# Patient Record
Sex: FEMALE | Race: BLACK OR AFRICAN AMERICAN | NOT HISPANIC OR LATINO | Employment: UNEMPLOYED | URBAN - METROPOLITAN AREA
[De-identification: names, ages, dates, MRNs, and addresses within clinical notes are randomized per-mention and may not be internally consistent; named-entity substitution may affect disease eponyms.]

---

## 2024-01-15 ENCOUNTER — HOSPITAL ENCOUNTER (EMERGENCY)
Facility: HOSPITAL | Age: 44
Discharge: HOME | End: 2024-01-16
Attending: FAMILY MEDICINE
Payer: MEDICAID

## 2024-01-15 VITALS
HEIGHT: 67 IN | OXYGEN SATURATION: 100 % | HEART RATE: 90 BPM | SYSTOLIC BLOOD PRESSURE: 150 MMHG | DIASTOLIC BLOOD PRESSURE: 86 MMHG | TEMPERATURE: 98 F | RESPIRATION RATE: 18 BRPM | WEIGHT: 130 LBS | BODY MASS INDEX: 20.4 KG/M2

## 2024-01-15 DIAGNOSIS — R60.9 SWELLING: ICD-10-CM

## 2024-01-15 DIAGNOSIS — S82.891A CLOSED FRACTURE OF RIGHT ANKLE, INITIAL ENCOUNTER: Primary | ICD-10-CM

## 2024-01-15 PROCEDURE — 99284 EMERGENCY DEPT VISIT MOD MDM: CPT

## 2024-01-15 PROCEDURE — 29515 APPLICATION SHORT LEG SPLINT: CPT | Mod: RT

## 2024-01-15 RX ORDER — DICLOFENAC SODIUM 50 MG/1
50 TABLET, DELAYED RELEASE ORAL 3 TIMES DAILY
Qty: 15 TABLET | Refills: 0 | Status: SHIPPED | OUTPATIENT
Start: 2024-01-15 | End: 2024-01-20

## 2024-01-15 RX ORDER — HYDROCODONE BITARTRATE AND ACETAMINOPHEN 5; 325 MG/1; MG/1
1 TABLET ORAL EVERY 6 HOURS PRN
Qty: 12 TABLET | Refills: 0 | Status: ON HOLD | OUTPATIENT
Start: 2024-01-15 | End: 2024-01-26

## 2024-01-15 RX ORDER — KETOROLAC TROMETHAMINE 30 MG/ML
60 INJECTION, SOLUTION INTRAMUSCULAR; INTRAVENOUS
Status: COMPLETED | OUTPATIENT
Start: 2024-01-15 | End: 2024-01-16

## 2024-01-16 DIAGNOSIS — S82.841A: Primary | ICD-10-CM

## 2024-01-16 PROCEDURE — 96372 THER/PROPH/DIAG INJ SC/IM: CPT | Mod: 59 | Performed by: FAMILY MEDICINE

## 2024-01-16 PROCEDURE — 29515 APPLICATION SHORT LEG SPLINT: CPT | Mod: RT

## 2024-01-16 PROCEDURE — 63600175 PHARM REV CODE 636 W HCPCS: Performed by: FAMILY MEDICINE

## 2024-01-16 RX ADMIN — KETOROLAC TROMETHAMINE 60 MG: 30 INJECTION, SOLUTION INTRAMUSCULAR; INTRAVENOUS at 12:01

## 2024-01-16 NOTE — ED NOTES
01/16/2024:0958am/ seen no referral done for self pay pt/ referral done at this time for [facility] ortho

## 2024-01-16 NOTE — ED PROVIDER NOTES
Encounter Date: 1/15/2024       History     Chief Complaint   Patient presents with    Ankle Pain     Slipped and fell. Right ankle is swollen. Pulse palpable.      Ankle pain   Right ankle after twisting her ankle patient has mild swelling no chronic condition contributing to today's episode    The history is provided by the patient.     Review of patient's allergies indicates:  No Known Allergies  No past medical history on file.  No past surgical history on file.  No family history on file.     Review of Systems   Constitutional:  Negative for fever.   HENT:  Negative for sore throat.    Respiratory:  Negative for shortness of breath.    Cardiovascular:  Negative for chest pain.   Gastrointestinal:  Negative for nausea.   Genitourinary:  Negative for dysuria.   Musculoskeletal:  Positive for arthralgias and myalgias. Negative for back pain.   Skin:  Negative for rash.   Neurological:  Negative for weakness.   Hematological:  Does not bruise/bleed easily.   All other systems reviewed and are negative.      Physical Exam     Initial Vitals [01/15/24 2155]   BP Pulse Resp Temp SpO2   (!) 150/86 90 18 98 °F (36.7 °C) 100 %      MAP       --         Physical Exam    Nursing note and vitals reviewed.  Constitutional: She appears well-developed.   HENT:   Head: Normocephalic and atraumatic.   Right Ear: External ear normal.   Left Ear: External ear normal.   Nose: Nose normal.   Mouth/Throat: Oropharynx is clear and moist. No oropharyngeal exudate.   Eyes: Conjunctivae and EOM are normal. Pupils are equal, round, and reactive to light. Right eye exhibits no discharge. Left eye exhibits no discharge.   Neck: Neck supple. No tracheal deviation present. No JVD present.   Normal range of motion.  Cardiovascular:  Normal rate, regular rhythm, normal heart sounds and intact distal pulses.     Exam reveals no gallop and no friction rub.       No murmur heard.  Pulmonary/Chest: Breath sounds normal. No stridor. No respiratory  distress. She has no wheezes. She has no rhonchi. She has no rales.   Abdominal: Abdomen is soft. Bowel sounds are normal. She exhibits no distension and no mass. There is no abdominal tenderness. There is no rebound and no guarding.   Musculoskeletal:         General: Edema present. Normal range of motion.      Cervical back: Normal range of motion and neck supple.     Neurological: She is alert and oriented to person, place, and time. She has normal strength. No cranial nerve deficit.   Skin: Skin is warm and dry. No rash and no abscess noted. No erythema.   Psychiatric: She has a normal mood and affect. Her behavior is normal. Judgment and thought content normal.         ED Course   Procedures  Labs Reviewed - No data to display       Imaging Results              X-Ray Ankle Complete Right (In process)                      Medications   ketorolac injection 60 mg (has no administration in time range)     Medical Decision Making  Fracture contusion sprain strain injury    Amount and/or Complexity of Data Reviewed  Radiology: ordered.    Risk  Prescription drug management.                                      Clinical Impression:  Final diagnoses:  [R60.9] Swelling  [S82.891A] Closed fracture of right ankle, initial encounter (Primary)          ED Disposition Condition    Discharge Stable          ED Prescriptions       Medication Sig Dispense Start Date End Date Auth. Provider    HYDROcodone-acetaminophen (NORCO) 5-325 mg per tablet Take 1 tablet by mouth every 6 (six) hours as needed for Pain. 12 tablet 1/15/2024 -- Timothy Guy MD    diclofenac (VOLTAREN) 50 MG EC tablet Take 1 tablet (50 mg total) by mouth 3 (three) times daily. for 5 days 15 tablet 1/15/2024 1/20/2024 Timothy Guy MD          Follow-up Information       Follow up With Specialties Details Why Contact Info    Orthopedic surgery    Follow-up with orthopedic surgery for definitive treatment no weight-bearing on that side              Timothy Guy MD  01/15/24 2122       Timothy Guy MD  01/15/24 0751

## 2024-01-16 NOTE — ED NOTES
Instructed on use of crutches. Return demonstration noted. Patient able to use crutches adequately.

## 2024-01-16 NOTE — ED NOTES
Short leg splint applied to right lower extremity. Instructed on CMS checks. Verbalized understanding.

## 2024-01-26 ENCOUNTER — HOSPITAL ENCOUNTER (OUTPATIENT)
Dept: RADIOLOGY | Facility: HOSPITAL | Age: 44
Discharge: HOME | End: 2024-01-26
Attending: STUDENT IN AN ORGANIZED HEALTH CARE EDUCATION/TRAINING PROGRAM
Payer: MEDICAID

## 2024-01-26 ENCOUNTER — HOSPITAL ENCOUNTER (OUTPATIENT)
Facility: HOSPITAL | Age: 44
Discharge: HOME | End: 2024-01-26
Attending: REHABILITATION UNIT | Admitting: REHABILITATION UNIT
Payer: MEDICAID

## 2024-01-26 ENCOUNTER — OFFICE VISIT (OUTPATIENT)
Dept: ORTHOPEDICS | Facility: CLINIC | Age: 44
End: 2024-01-26
Payer: MEDICAID

## 2024-01-26 ENCOUNTER — ANESTHESIA EVENT (OUTPATIENT)
Dept: SURGERY | Facility: HOSPITAL | Age: 44
End: 2024-01-26
Payer: MEDICAID

## 2024-01-26 ENCOUNTER — ANESTHESIA (OUTPATIENT)
Dept: SURGERY | Facility: HOSPITAL | Age: 44
End: 2024-01-26
Payer: MEDICAID

## 2024-01-26 VITALS
HEIGHT: 67 IN | BODY MASS INDEX: 21.19 KG/M2 | TEMPERATURE: 99 F | HEART RATE: 109 BPM | SYSTOLIC BLOOD PRESSURE: 121 MMHG | WEIGHT: 135 LBS | DIASTOLIC BLOOD PRESSURE: 84 MMHG

## 2024-01-26 DIAGNOSIS — S82.871A CLOSED DISPLACED PILON FRACTURE OF RIGHT TIBIA, INITIAL ENCOUNTER: Primary | ICD-10-CM

## 2024-01-26 DIAGNOSIS — S82.871A CLOSED DISPLACED PILON FRACTURE OF RIGHT TIBIA, INITIAL ENCOUNTER: ICD-10-CM

## 2024-01-26 DIAGNOSIS — S82.841A: ICD-10-CM

## 2024-01-26 LAB
B-HCG UR QL: NEGATIVE
CTP QC/QA: YES

## 2024-01-26 PROCEDURE — C1713 ANCHOR/SCREW BN/BN,TIS/BN: HCPCS | Performed by: REHABILITATION UNIT

## 2024-01-26 PROCEDURE — 27825 TREAT LOWER LEG FRACTURE: CPT | Mod: 51,RT,, | Performed by: REHABILITATION UNIT

## 2024-01-26 PROCEDURE — 37000008 HC ANESTHESIA 1ST 15 MINUTES: Performed by: REHABILITATION UNIT

## 2024-01-26 PROCEDURE — 99204 OFFICE O/P NEW MOD 45 MIN: CPT | Mod: S$PBB,57,, | Performed by: REHABILITATION UNIT

## 2024-01-26 PROCEDURE — 36000709 HC OR TIME LEV III EA ADD 15 MIN: Performed by: REHABILITATION UNIT

## 2024-01-26 PROCEDURE — 27201423 OPTIME MED/SURG SUP & DEVICES STERILE SUPPLY: Performed by: REHABILITATION UNIT

## 2024-01-26 PROCEDURE — 25000003 PHARM REV CODE 250: Performed by: ANESTHESIOLOGY

## 2024-01-26 PROCEDURE — 73590 X-RAY EXAM OF LOWER LEG: CPT | Mod: TC,RT

## 2024-01-26 PROCEDURE — 63600175 PHARM REV CODE 636 W HCPCS: Performed by: NURSE ANESTHETIST, CERTIFIED REGISTERED

## 2024-01-26 PROCEDURE — 25000003 PHARM REV CODE 250: Performed by: NURSE ANESTHETIST, CERTIFIED REGISTERED

## 2024-01-26 PROCEDURE — D9220A PRA ANESTHESIA: Mod: ANES,,, | Performed by: ANESTHESIOLOGY

## 2024-01-26 PROCEDURE — 37000009 HC ANESTHESIA EA ADD 15 MINS: Performed by: REHABILITATION UNIT

## 2024-01-26 PROCEDURE — 25000003 PHARM REV CODE 250: Performed by: STUDENT IN AN ORGANIZED HEALTH CARE EDUCATION/TRAINING PROGRAM

## 2024-01-26 PROCEDURE — 63600175 PHARM REV CODE 636 W HCPCS: Performed by: STUDENT IN AN ORGANIZED HEALTH CARE EDUCATION/TRAINING PROGRAM

## 2024-01-26 PROCEDURE — 99215 OFFICE O/P EST HI 40 MIN: CPT | Mod: PBBFAC,25

## 2024-01-26 PROCEDURE — 71000015 HC POSTOP RECOV 1ST HR: Performed by: REHABILITATION UNIT

## 2024-01-26 PROCEDURE — 36000708 HC OR TIME LEV III 1ST 15 MIN: Performed by: REHABILITATION UNIT

## 2024-01-26 PROCEDURE — 71000016 HC POSTOP RECOV ADDL HR: Performed by: REHABILITATION UNIT

## 2024-01-26 PROCEDURE — 71000033 HC RECOVERY, INTIAL HOUR: Performed by: REHABILITATION UNIT

## 2024-01-26 PROCEDURE — 20692 APPL MLTPLN UNI EXT FIXJ SYS: CPT | Mod: RT,,, | Performed by: REHABILITATION UNIT

## 2024-01-26 PROCEDURE — D9220A PRA ANESTHESIA: Mod: CRNA,,, | Performed by: NURSE ANESTHETIST, CERTIFIED REGISTERED

## 2024-01-26 PROCEDURE — 63600175 PHARM REV CODE 636 W HCPCS: Performed by: ANESTHESIOLOGY

## 2024-01-26 DEVICE — ROD CONNECT EX FIX TIB 11X350M: Type: IMPLANTABLE DEVICE | Site: TIBIA | Status: FUNCTIONAL

## 2024-01-26 DEVICE — PIN TRNSFIXTN APEX 5/6X40X300M: Type: IMPLANTABLE DEVICE | Site: TIBIA | Status: FUNCTIONAL

## 2024-01-26 DEVICE — CLAMP PIN EXT FIXATOR 4/5/6MM: Type: IMPLANTABLE DEVICE | Site: TIBIA | Status: FUNCTIONAL

## 2024-01-26 DEVICE — COUPLING HOFF3 RR 5X8X11MM: Type: IMPLANTABLE DEVICE | Site: TIBIA | Status: FUNCTIONAL

## 2024-01-26 DEVICE — PIN APEX HALF SD 3/5 120X20MM: Type: IMPLANTABLE DEVICE | Site: TIBIA | Status: FUNCTIONAL

## 2024-01-26 DEVICE — PIN EXT FIX APEX 5X150MM SS: Type: IMPLANTABLE DEVICE | Site: TIBIA | Status: FUNCTIONAL

## 2024-01-26 RX ORDER — GABAPENTIN 300 MG/1
300 CAPSULE ORAL
Status: COMPLETED | OUTPATIENT
Start: 2024-01-26 | End: 2024-01-26

## 2024-01-26 RX ORDER — DEXAMETHASONE SODIUM PHOSPHATE 4 MG/ML
INJECTION, SOLUTION INTRA-ARTICULAR; INTRALESIONAL; INTRAMUSCULAR; INTRAVENOUS; SOFT TISSUE
Status: DISCONTINUED | OUTPATIENT
Start: 2024-01-26 | End: 2024-01-26

## 2024-01-26 RX ORDER — KETAMINE HCL IN 0.9 % NACL 50 MG/5 ML
SYRINGE (ML) INTRAVENOUS
Status: DISCONTINUED | OUTPATIENT
Start: 2024-01-26 | End: 2024-01-26

## 2024-01-26 RX ORDER — METOCLOPRAMIDE HYDROCHLORIDE 5 MG/ML
10 INJECTION INTRAMUSCULAR; INTRAVENOUS ONCE AS NEEDED
Status: ACTIVE | OUTPATIENT
Start: 2024-01-26 | End: 2035-06-24

## 2024-01-26 RX ORDER — ASPIRIN 81 MG/1
81 TABLET ORAL 2 TIMES DAILY
Qty: 84 TABLET | Refills: 0 | Status: ON HOLD | OUTPATIENT
Start: 2024-01-26 | End: 2024-02-07

## 2024-01-26 RX ORDER — DROPERIDOL 2.5 MG/ML
0.25 INJECTION, SOLUTION INTRAMUSCULAR; INTRAVENOUS ONCE AS NEEDED
Status: DISCONTINUED | OUTPATIENT
Start: 2024-01-26 | End: 2024-02-05

## 2024-01-26 RX ORDER — FENTANYL CITRATE 50 UG/ML
INJECTION, SOLUTION INTRAMUSCULAR; INTRAVENOUS
Status: DISCONTINUED | OUTPATIENT
Start: 2024-01-26 | End: 2024-01-26

## 2024-01-26 RX ORDER — ACETAMINOPHEN 500 MG
1000 TABLET ORAL
Status: COMPLETED | OUTPATIENT
Start: 2024-01-26 | End: 2024-01-26

## 2024-01-26 RX ORDER — DIPHENHYDRAMINE HYDROCHLORIDE 50 MG/ML
6.25 INJECTION INTRAMUSCULAR; INTRAVENOUS ONCE AS NEEDED
Status: DISCONTINUED | OUTPATIENT
Start: 2024-01-26 | End: 2024-02-05

## 2024-01-26 RX ORDER — MUPIROCIN 20 MG/G
OINTMENT TOPICAL
Status: CANCELLED | OUTPATIENT
Start: 2024-01-26

## 2024-01-26 RX ORDER — HYDROMORPHONE HYDROCHLORIDE 1 MG/ML
0.2 INJECTION, SOLUTION INTRAMUSCULAR; INTRAVENOUS; SUBCUTANEOUS EVERY 5 MIN PRN
Status: DISCONTINUED | OUTPATIENT
Start: 2024-01-26 | End: 2024-02-05

## 2024-01-26 RX ORDER — IBUPROFEN 400 MG/1
400 TABLET ORAL EVERY 4 HOURS
Status: ON HOLD | COMMUNITY
End: 2024-01-26

## 2024-01-26 RX ORDER — MIDAZOLAM HYDROCHLORIDE 1 MG/ML
2 INJECTION INTRAMUSCULAR; INTRAVENOUS ONCE
Status: COMPLETED | OUTPATIENT
Start: 2024-01-26 | End: 2024-01-26

## 2024-01-26 RX ORDER — DEXMEDETOMIDINE HYDROCHLORIDE 100 UG/ML
INJECTION, SOLUTION INTRAVENOUS
Status: DISCONTINUED | OUTPATIENT
Start: 2024-01-26 | End: 2024-01-26

## 2024-01-26 RX ORDER — MIDAZOLAM HYDROCHLORIDE 1 MG/ML
1 INJECTION INTRAMUSCULAR; INTRAVENOUS ONCE AS NEEDED
Status: CANCELLED | OUTPATIENT
Start: 2024-01-26 | End: 2035-06-24

## 2024-01-26 RX ORDER — SODIUM CHLORIDE, SODIUM LACTATE, POTASSIUM CHLORIDE, CALCIUM CHLORIDE 600; 310; 30; 20 MG/100ML; MG/100ML; MG/100ML; MG/100ML
INJECTION, SOLUTION INTRAVENOUS CONTINUOUS
Status: DISCONTINUED | OUTPATIENT
Start: 2024-01-26 | End: 2024-01-26 | Stop reason: HOSPADM

## 2024-01-26 RX ORDER — HYDROCODONE BITARTRATE AND ACETAMINOPHEN 10; 325 MG/1; MG/1
1 TABLET ORAL EVERY 6 HOURS PRN
Qty: 28 TABLET | Refills: 0 | Status: SHIPPED | OUTPATIENT
Start: 2024-01-26 | End: 2024-01-26

## 2024-01-26 RX ORDER — TRAMADOL HYDROCHLORIDE 50 MG/1
100 TABLET ORAL
Status: COMPLETED | OUTPATIENT
Start: 2024-01-26 | End: 2024-01-26

## 2024-01-26 RX ORDER — OXYCODONE HYDROCHLORIDE 5 MG/1
5 TABLET ORAL
Status: DISCONTINUED | OUTPATIENT
Start: 2024-01-26 | End: 2024-02-05

## 2024-01-26 RX ORDER — GABAPENTIN 600 MG/1
600 TABLET ORAL 3 TIMES DAILY
Qty: 90 TABLET | Refills: 11 | Status: SHIPPED | OUTPATIENT
Start: 2024-01-26 | End: 2025-01-25

## 2024-01-26 RX ORDER — METHOCARBAMOL 750 MG/1
750 TABLET, FILM COATED ORAL 4 TIMES DAILY
Qty: 40 TABLET | Refills: 0 | Status: ON HOLD | OUTPATIENT
Start: 2024-01-26 | End: 2024-02-07 | Stop reason: HOSPADM

## 2024-01-26 RX ORDER — ONDANSETRON HYDROCHLORIDE 2 MG/ML
INJECTION, SOLUTION INTRAVENOUS
Status: DISCONTINUED | OUTPATIENT
Start: 2024-01-26 | End: 2024-01-26

## 2024-01-26 RX ORDER — HYDROCODONE BITARTRATE AND ACETAMINOPHEN 10; 325 MG/1; MG/1
1 TABLET ORAL EVERY 6 HOURS PRN
Qty: 28 TABLET | Refills: 0 | Status: ON HOLD | OUTPATIENT
Start: 2024-01-26 | End: 2024-02-07 | Stop reason: HOSPADM

## 2024-01-26 RX ORDER — PROPOFOL 10 MG/ML
VIAL (ML) INTRAVENOUS
Status: DISCONTINUED | OUTPATIENT
Start: 2024-01-26 | End: 2024-01-26

## 2024-01-26 RX ORDER — KETOROLAC TROMETHAMINE 30 MG/ML
INJECTION, SOLUTION INTRAMUSCULAR; INTRAVENOUS
Status: DISCONTINUED | OUTPATIENT
Start: 2024-01-26 | End: 2024-01-26

## 2024-01-26 RX ORDER — LIDOCAINE HYDROCHLORIDE 20 MG/ML
INJECTION, SOLUTION EPIDURAL; INFILTRATION; INTRACAUDAL; PERINEURAL
Status: DISCONTINUED | OUTPATIENT
Start: 2024-01-26 | End: 2024-01-26

## 2024-01-26 RX ORDER — IBUPROFEN 800 MG/1
800 TABLET ORAL 3 TIMES DAILY
Qty: 60 TABLET | Refills: 0 | Status: SHIPPED | OUTPATIENT
Start: 2024-01-26

## 2024-01-26 RX ORDER — LIDOCAINE HYDROCHLORIDE 10 MG/ML
1 INJECTION, SOLUTION EPIDURAL; INFILTRATION; INTRACAUDAL; PERINEURAL ONCE
Status: ACTIVE | OUTPATIENT
Start: 2024-01-26

## 2024-01-26 RX ADMIN — CEFAZOLIN 2 G: 2 INJECTION, POWDER, FOR SOLUTION INTRAMUSCULAR; INTRAVENOUS at 12:01

## 2024-01-26 RX ADMIN — Medication 30 MG: at 12:01

## 2024-01-26 RX ADMIN — ONDANSETRON 4 MG: 2 INJECTION INTRAMUSCULAR; INTRAVENOUS at 12:01

## 2024-01-26 RX ADMIN — DEXAMETHASONE SODIUM PHOSPHATE 8 MG: 4 INJECTION, SOLUTION INTRA-ARTICULAR; INTRALESIONAL; INTRAMUSCULAR; INTRAVENOUS; SOFT TISSUE at 12:01

## 2024-01-26 RX ADMIN — ACETAMINOPHEN 1000 MG: 500 TABLET ORAL at 10:01

## 2024-01-26 RX ADMIN — MIDAZOLAM HYDROCHLORIDE 2 MG: 1 INJECTION, SOLUTION INTRAMUSCULAR; INTRAVENOUS at 11:01

## 2024-01-26 RX ADMIN — PROPOFOL 160 MG: 10 INJECTION, EMULSION INTRAVENOUS at 11:01

## 2024-01-26 RX ADMIN — GABAPENTIN 300 MG: 300 CAPSULE ORAL at 10:01

## 2024-01-26 RX ADMIN — LIDOCAINE HYDROCHLORIDE 60 MG: 20 INJECTION, SOLUTION EPIDURAL; INFILTRATION; INTRACAUDAL; PERINEURAL at 11:01

## 2024-01-26 RX ADMIN — OXYCODONE HYDROCHLORIDE 5 MG: 5 TABLET ORAL at 02:01

## 2024-01-26 RX ADMIN — SODIUM CHLORIDE, POTASSIUM CHLORIDE, SODIUM LACTATE AND CALCIUM CHLORIDE: 600; 310; 30; 20 INJECTION, SOLUTION INTRAVENOUS at 11:01

## 2024-01-26 RX ADMIN — KETOROLAC TROMETHAMINE 30 MG: 30 INJECTION, SOLUTION INTRAMUSCULAR at 12:01

## 2024-01-26 RX ADMIN — DEXMEDETOMIDINE 20 MCG: 200 INJECTION, SOLUTION INTRAVENOUS at 12:01

## 2024-01-26 RX ADMIN — TRAMADOL HYDROCHLORIDE 100 MG: 50 TABLET, COATED ORAL at 10:01

## 2024-01-26 RX ADMIN — FENTANYL CITRATE 50 MCG: 50 INJECTION INTRAMUSCULAR; INTRAVENOUS at 12:01

## 2024-01-26 NOTE — ANESTHESIA POSTPROCEDURE EVALUATION
Anesthesia Post Evaluation    Patient: Jasmin Moreno    Procedure(s) Performed: Procedure(s) (LRB):  APPLICATION, EXTERNAL FIXATION DEVICE (Right)    Final Anesthesia Type: general      Patient location during evaluation: PACU  Patient participation: Yes- Able to Participate  Level of consciousness: awake and alert  Post-procedure vital signs: reviewed and stable  Pain management: adequate  Airway patency: patent    PONV status at discharge: No PONV  Anesthetic complications: no      Cardiovascular status: hemodynamically stable  Respiratory status: unassisted  Hydration status: euvolemic  Follow-up not needed.              Vitals Value Taken Time   /68 01/26/24 1330   Temp 36.3 °C (97.3 °F) 01/26/24 1310   Pulse 72 01/26/24 1330   Resp 14 01/26/24 1330   SpO2 100 % 01/26/24 1330         No case tracking events are documented in the log.      Pain/Rodrigo Score: Pain Rating Prior to Med Admin: 0 (1/26/2024 10:33 AM)  Rodrigo Score: 5 (1/26/2024  1:10 PM)

## 2024-01-26 NOTE — TRANSFER OF CARE
Anesthesia Transfer of Care Note    Patient: Jasmin Moreno    Procedure(s) Performed: Procedure(s) (LRB):  APPLICATION, EXTERNAL FIXATION DEVICE (Right)    Patient location: PACU    Anesthesia Type: general    Transport from OR: Transported from OR on room air with adequate spontaneous ventilation    Post pain: adequate analgesia    Post assessment: no apparent anesthetic complications    Post vital signs: stable    Level of consciousness: sedated    Nausea/Vomiting: no nausea/vomiting    Complications: none    Transfer of care protocol was followed      Last vitals: Visit Vitals  /65 (BP Location: Right arm, Patient Position: Lying)   Pulse 70   Temp 36.3 °C (97.3 °F)   Resp 13   Wt 60.5 kg (133 lb 6.4 oz)   LMP 01/15/2024   SpO2 100%   Breastfeeding No   BMI 20.89 kg/m²

## 2024-01-26 NOTE — DISCHARGE INSTRUCTIONS
-Activity: nonweightbearing right lower extremity, use crutches or wheelchair  -take narcotic pain medication as prescribed.  If pain is not severe, wean herself from the narcotic pain medication.  You can take up to 4000 mg of Tylenol daily.  Note that the narcotic pain medication that you have been prescribed has Tylenol in it.  We recommend weaning from the narcotic pain medication to taking Tylenol 1000 mg scheduled 3 times a day interspersed with ibuprofen 800 mg 3 times a day.  This way he will be getting something for pain every few hours.  -keeps surgical incisions clean and dry. Keep dressing intact. Reinforce as needed  -if you have increasing extreme pain or numbness and tingling that's worsening, please call us  -call if you have fevers, drainage, or foul smell coming from surgical incisions  -if placed in a splint, do not get the splint wet  -you can take Benadryl if you start to have itching from the splint  -Follow up as scheduled in approximately 2 weeks  Take pain medication as prescribed.  · Keep arm elevated on pillow.  · No driving or consuming alcohol for the next 24 hours or while taking narcotic pain medicine.  · May apply ice pack to surgical area for 20 minutes at a time 6-8 times per day.  ·  ·  Thanks for choosing [facility]! Have a smooth recovery!

## 2024-01-26 NOTE — LETTER
January 26, 2024      [facility]  [facility] [facility] [address]  Phone: [phone]       Date of Visit: 01/26/2024    To Whom It May Concern:    Please be advised that under state and federal laws as it relates to patient privacy and Health Insurance Accountability Act (HIPAA), we can not release our patient(s) name without authorization. Although, we can confirm that the individual listed below did accompany a person to our facility for healthcare services to be provided.    This document confirms that Rebekah Narayanan  accompanied a patient to our facility on 01/26/2024.    Sincerely,     Elsy Mohr LPN

## 2024-01-26 NOTE — H&P (VIEW-ONLY)
"Subjective:      Patient ID: Jasmin Moreno is a 44 y.o. female.    Chief Complaint: Injury and Pain of the Right Ankle (Injuried right ankle on 1/15/24 at home.  Pain OTC using walker to ambulate )    HPI:   Jasmin Moreno is a 44 y.o. female who presents today for initial evaluation of her right lower extremity.  She reports that on 01/15/2024 she slipped and fell injuring her right ankle.  She went to an outside emergency department.  Note and imaging were reviewed.  Radiographs showed a distal tibia and fibula fracture.  She was placed into a splint and prescribed pain medication and anti-inflammatories.  She is using a walker today.  She has maintained her splint.  Pain is controlled.  No sensory or motor changes distally.  She is NPO.  Nonsmoker.  No significant past medical history.    Past Medical History:   Diagnosis Date    Fractures      Past Surgical History:   Procedure Laterality Date    none       Social History     Socioeconomic History    Marital status: Single   Tobacco Use    Smoking status: Never    Smokeless tobacco: Never   Substance and Sexual Activity    Alcohol use: Never    Drug use: Never    Sexual activity: Not Currently     Partners: Male     Birth control/protection: None         Current Outpatient Medications:     ibuprofen (ADVIL,MOTRIN) 400 MG tablet, Take 400 mg by mouth every 4 (four) hours., Disp: , Rfl:     HYDROcodone-acetaminophen (NORCO) 5-325 mg per tablet, Take 1 tablet by mouth every 6 (six) hours as needed for Pain. (Patient not taking: Reported on 1/26/2024), Disp: 12 tablet, Rfl: 0  Review of patient's allergies indicates:  No Known Allergies    /84 (BP Location: Left arm, Patient Position: Sitting, BP Method: Medium (Automatic))   Pulse 109   Temp 98.5 °F (36.9 °C)   Ht 5' 7" (1.702 m)   Wt 61.2 kg (135 lb)   LMP 01/15/2024   BMI 21.14 kg/m²     Comprehensive review of systems completed and negative except as per HPI.        Objective:   Head: " Normocephalic, without obvious abnormality, atraumatic  Eyes: conjunctivae/corneas clear. EOM's intact  Ears: normal external appearance  Nose: Nares normal. Septum midline. Mucosa normal. No drainage  Throat: normal findings: lips normal without lesions  Lungs: unlabored breathing on room air  Chest wall: symmetric chest rise  Heart: regular rate and rhythm  Pulses: 2+ and symmetric  Skin: Skin color, texture, turgor normal. No rashes or lesions  Neurologic: Grossly normal    RLE    Skin is intact with small abrasion to anteromedial ankle.  There is moderate soft tissue swelling about the ankle.  No skin wrinkles present.  Ecchymosis about the ankle and foot.  Reduced range of motion of the distal extremity.  Tenderness diffusely about the ankle.  No tenderness proximally.  Sensation is intact to light touch to dp/sp/t distributions.  Able to flex and extend her great toe.  Compartments are soft and easily compressible.  Temperature and color appropriate.  No excessive pain to passive stretch of any compartment.    Assessment:     Imaging:   X-Ray Ankle Complete Right  Narrative: EXAMINATION:  XR ANKLE COMPLETE 3 VIEW RIGHT    CLINICAL HISTORY:  Edema, unspecified    COMPARISON:  None.    FINDINGS:  Comminuted displaced fracture dislocation involving the distal tibia and lateral malleolus with displacement of fracture fragments and significant soft tissue swelling    Joint spaces preserved.    No blastic or lytic lesions.    Soft tissues within normal limits.  Impression: Fracture dislocation as above.    Electronically signed by: Andrea Cornell  Date:    01/16/2024  Time:    08:34    Previously obtained right ankle films show partial articular distal pilon and associated distal fibula fracture with displacement and shortening.    Repeat radiographs of the right tibia obtained today personally reviewed showing stable appearance fractures with persistent displacement and shortening.  No proximal injury  appreciated.          1. Closed displaced pilon fracture of right tibia, initial encounter          Plan:       Orders Placed This Encounter    X-Ray Tibia Fibula 2 View Right    Case Request Operating Room: APPLICATION, EXTERNAL FIXATION DEVICE     Imaging and exam findings discussed with the patient.  She sustained a fall 11 days ago resulting in a displaced and shortened right partial articular pilon fracture and associated distal fibula fracture.  Recommend surgery to restore anatomy and function.  We discussed that this would be a staged procedure due to the extensive swelling and shortening on radiographs.  Plan for placement of the external fixator to her right lower extremity today.  She is NPO in anticipation of this. We discussed operative and nonoperative options. The patient had an opportunity to ask questions. All questions were answered. The risks and benefits of surgery were discussed with the patient, including but not limited to bleeding, infection, damage to surrounding nerves and structures, need for further surgery which is planned with this being the 1st stage, repair failure, anesthesia risk, progression of arthritis, blood clots, and medical risks of surgery. The patient voiced understanding of the risks and benefits and provided written consent to the procedure.  Strict nonweightbearing precautions.  Elevate extremity.  She will have a CT scan completed postoperatively.  We will plan for referral to 1 of my trauma partners for definitive fixation as soft tissue allows.  All of her questions were answered and she was in agreement.    Addendum:  This is a medically necessary procedure to restore anatomy and optimize functional outcome.

## 2024-01-26 NOTE — ANESTHESIA PROCEDURE NOTES
Intubation    Date/Time: 1/26/2024 11:59 AM    Performed by: Eb Burrows CRNA  Authorized by: Amaya Fajardo MD    Intubation:     Induction:  Intravenous    Intubated:  Postinduction    Mask Ventilation:  Not attempted    Attempts:  1    Attempted By:  CRNA    Difficult Airway Encountered?: No      Complications:  None    Airway Device:  Supraglottic airway/LMA    Airway Device Size:  4.0    Style/Cuff Inflation:  Other (see comments) (IGEL)    Secured at:  The lips    Placement Verified By:  Capnometry    Complicating Factors:  None    Findings Post-Intubation:  BS equal bilateral and atraumatic/condition of teeth unchanged

## 2024-01-26 NOTE — ANESTHESIA PREPROCEDURE EVALUATION
01/26/2024  Jasmin Moreno is a 44 y.o., female without significant PMHx now with  right distal tibia and fibula fracture for application of Ex Fix.      Pre-op Assessment    I have reviewed the Patient Summary Reports.     I have reviewed the Nursing Notes. I have reviewed the NPO Status.   I have reviewed the Medications.     Review of Systems  Anesthesia Hx:  No problems with previous Anesthesia             Denies Family Hx of Anesthesia complications.    Denies Personal Hx of Anesthesia complications.                    Hematology/Oncology:  Hematology Normal   Oncology Normal                                   EENT/Dental:  EENT/Dental Normal    Ears General/Symptom(s)    Ear Disease:  Tympanic Membrane Problem        Cardiovascular:  Cardiovascular Normal                                            Pulmonary:  Pulmonary Normal                       Renal/:  Renal/ Normal                 Hepatic/GI:  Hepatic/GI Normal                 Musculoskeletal:  Musculoskeletal Normal                Neurological:  Neurology Normal                                      Endocrine:  Endocrine Normal            Dermatological:  Skin Normal    Psych:  Psychiatric Normal                    Physical Exam  General: Well nourished    Airway:  Mallampati: II   Mouth Opening: Normal  TM Distance: Normal  Tongue: Normal  Neck ROM: Normal ROM    Dental:  Intact    Chest/Lungs:  Clear to auscultation, Normal Respiratory Rate    Heart:  Rate: Normal  Rhythm: Regular Rhythm        Anesthesia Plan  Type of Anesthesia, risks & benefits discussed:    Anesthesia Type: Gen Supraglottic Airway  Intra-op Monitoring Plan: Standard ASA Monitors  Post Op Pain Control Plan: multimodal analgesia and IV/PO Opioids PRN  Induction:  IV  Informed Consent: Informed consent signed with the Patient and all parties understand the risks and agree  with anesthesia plan.  All questions answered.   ASA Score: 1  Day of Surgery Review of History & Physical: H&P Update referred to the surgeon/provider.I have interviewed and examined the patient. I have reviewed the patient's H&P dated: There are no significant changes. H&P completed by Anesthesiologist.    Ready For Surgery From Anesthesia Perspective.     .

## 2024-01-26 NOTE — OP NOTE
Operative Note     Date of Service: 01/27/2024     Pre-Operative Diagnosis:  Closed right pilon fracture    Post-Operative Diagnosis: Same     Procedure(s):   1. Closed reduction right tib fib (pilon)  2. Multi planar external fixation placement       Anesthesia: General     Surgeon: Tim Rashid MD, was present and scrubbed for the key portions of the procedure.     Assistant(s):   MD Alvin Domínguez MD    Indications   Patient is a 44 y.o.female with a closed right pilon fracture after a fall on 01/15/2024.  She went to an outside emergency department where she was splinted and then followed up with us in clinic on 01/26/2024. The patient was checked again in the Holding Room. The risks, benefits, complications, treatment options, and expected outcomes were reviewed again with the patient. The patient has elected to proceed with closed reduction of her right tib-fib fracture with multiplanar ex fix placement. The risks and potential complications include but are not limited to infection, nonunion, malunion, hardware irritation, scar, nerve injury, vascular injury, persistent pain, potential skin necrosis, deep vein thrombosis, possible pulmonary embolus, complications of the anesthetics and failure of the implants with potential need for future surgery to remove or revise. The patient concurred with the proposed plan, giving informed consent. The site of surgery was identified by the patient and properly noted/marked by me.     Implants  Fitz delta frame with two tibial half pins, a calcaneal transfixion pin, and a medial cuneiform pin    Procedure Details:   The patient was taken to the operating room and positioned supine with a bone foam and bump. Prophylacic antibiotics were given.The patient was given general anesthesia.   A Time-Out was held and the patient, location and procedure of right lower extremity external fixation were verified and agreed upon by all members of the operating  room staff.   The right lower extremity was prepped and draped in normal sterile manner.  We began by constructing a simple [place] delta frame ex fix.  We would identified the proximal extent of the fracture site on x-ray.  We predrilled for a tibial half pin taking care to ensure that this would be well out of the way of future plate fixation of the fracture.  We then placed this tibial half pin.  We then predrilled for the 2nd tibial half pin proximally.  We placed this half pin.  It should be noted that prior to placing each half pin an incision was made and hemostat was used to spread down to bone.  Placement was confirmed on fluoroscopy and deemed to be adequate.  We then under fluoroscopy made an incision on the medial aspect of the calcaneus taking care to spread down to the bone with a hemostat.  A calcaneal transfixion pin was placed in standard fashion from medial to lateral with fluoroscopic guidance.  We then placed a pin in the medial cuneiform and confirmed placement with fluoroscopic guidance.  Again it should be noted that prior to placing the pin incision was made and hemostat was used to spread down to bone.  We then closed reduced the fracture with acceptable alignment in the AP and lateral radiographs.  The external fixation device was locked down and final tightened.  Pin sites were dressed with Xeroform, Kerlix, Coban.   Instrument, sponge, and needle counts were correct prior to wound closure and at the conclusion of the case.   The patient was awoken from anesthesia, tolerated the procedure well and taken to recovery in stable condition.   Findings:  Closed right pilon fracture   Estimated blood loss: 10cc   Drains: none   Total IV fluids: Per anesthesia records   Specimens: none   Complications: None, pt tolerated the procedure well   Disposition: Awakened from anesthesia, and taken to the recovery room in a stable condition, having suffered no apparent untoward event   Condition: Stable    Post-Operative Management   We will get a CT postoperatively to evaluate for surgical planning  We will have her follow up in clinic next week for swelling check  Nonweightbearing right lower extremity  Reinforce pin site dressings as needed  Compartment syndrome precautions   Ice  Elevate   Multimodal pain control

## 2024-01-29 VITALS
BODY MASS INDEX: 20.89 KG/M2 | DIASTOLIC BLOOD PRESSURE: 91 MMHG | WEIGHT: 133.38 LBS | OXYGEN SATURATION: 100 % | HEART RATE: 84 BPM | SYSTOLIC BLOOD PRESSURE: 126 MMHG | TEMPERATURE: 98 F | RESPIRATION RATE: 20 BRPM

## 2024-02-05 ENCOUNTER — HOSPITAL ENCOUNTER (OUTPATIENT)
Facility: HOSPITAL | Age: 44
Discharge: HOME | DRG: 494 | End: 2024-02-07
Attending: EMERGENCY MEDICINE | Admitting: ORTHOPAEDIC SURGERY
Payer: MEDICAID

## 2024-02-05 ENCOUNTER — HOSPITAL ENCOUNTER (OUTPATIENT)
Dept: RADIOLOGY | Facility: HOSPITAL | Age: 44
Discharge: HOME | End: 2024-02-05
Attending: STUDENT IN AN ORGANIZED HEALTH CARE EDUCATION/TRAINING PROGRAM
Payer: MEDICAID

## 2024-02-05 ENCOUNTER — OFFICE VISIT (OUTPATIENT)
Dept: ORTHOPEDICS | Facility: CLINIC | Age: 44
End: 2024-02-05
Payer: MEDICAID

## 2024-02-05 VITALS
OXYGEN SATURATION: 96 % | HEART RATE: 95 BPM | TEMPERATURE: 100 F | DIASTOLIC BLOOD PRESSURE: 84 MMHG | RESPIRATION RATE: 20 BRPM | BODY MASS INDEX: 20.88 KG/M2 | WEIGHT: 133 LBS | HEIGHT: 67 IN | SYSTOLIC BLOOD PRESSURE: 126 MMHG

## 2024-02-05 DIAGNOSIS — Z01.818 PRE-OP EVALUATION: ICD-10-CM

## 2024-02-05 DIAGNOSIS — M79.89 RIGHT LEG SWELLING: ICD-10-CM

## 2024-02-05 DIAGNOSIS — M25.571 ACUTE RIGHT ANKLE PAIN: ICD-10-CM

## 2024-02-05 DIAGNOSIS — S82.871A CLOSED DISPLACED PILON FRACTURE OF RIGHT TIBIA, INITIAL ENCOUNTER: ICD-10-CM

## 2024-02-05 DIAGNOSIS — S82.871G CLOSED DISPLACED PILON FRACTURE OF RIGHT TIBIA WITH DELAYED HEALING, SUBSEQUENT ENCOUNTER: Primary | ICD-10-CM

## 2024-02-05 DIAGNOSIS — M25.571 ACUTE RIGHT ANKLE PAIN: Primary | ICD-10-CM

## 2024-02-05 LAB
ABORH RETYPE: NORMAL
ALBUMIN SERPL-MCNC: 3.5 G/DL (ref 3.5–5)
ALBUMIN/GLOB SERPL: 1.1 RATIO (ref 1.1–2)
ALP SERPL-CCNC: 68 UNIT/L (ref 40–150)
ALT SERPL-CCNC: 42 UNIT/L (ref 0–55)
APPEARANCE UR: CLEAR
APTT PPP: 27.1 SECONDS (ref 23.2–33.7)
AST SERPL-CCNC: 29 UNIT/L (ref 5–34)
B-HCG SERPL QL: NEGATIVE
BACTERIA #/AREA URNS AUTO: ABNORMAL /HPF
BASOPHILS # BLD AUTO: 0.03 X10(3)/MCL
BASOPHILS NFR BLD AUTO: 0.5 %
BILIRUB SERPL-MCNC: 0.3 MG/DL
BILIRUB UR QL STRIP.AUTO: NEGATIVE
BUN SERPL-MCNC: 11.7 MG/DL (ref 7–18.7)
CALCIUM SERPL-MCNC: 8.8 MG/DL (ref 8.4–10.2)
CHLORIDE SERPL-SCNC: 111 MMOL/L (ref 98–107)
CO2 SERPL-SCNC: 19 MMOL/L (ref 22–29)
COLOR UR AUTO: COLORLESS
CREAT SERPL-MCNC: 0.75 MG/DL (ref 0.55–1.02)
DEPRECATED CALCIDIOL+CALCIFEROL SERPL-MC: 25.7 NG/ML (ref 30–80)
EOSINOPHIL # BLD AUTO: 0.29 X10(3)/MCL (ref 0–0.9)
EOSINOPHIL NFR BLD AUTO: 4.7 %
ERYTHROCYTE [DISTWIDTH] IN BLOOD BY AUTOMATED COUNT: 19.7 % (ref 11.5–17)
GFR SERPLBLD CREATININE-BSD FMLA CKD-EPI: >60 MLS/MIN/1.73/M2
GLOBULIN SER-MCNC: 3.1 GM/DL (ref 2.4–3.5)
GLUCOSE SERPL-MCNC: 97 MG/DL (ref 74–100)
GLUCOSE UR QL STRIP.AUTO: NORMAL
GROUP & RH: NORMAL
HCT VFR BLD AUTO: 31.4 % (ref 37–47)
HGB BLD-MCNC: 10 G/DL (ref 12–16)
IMM GRANULOCYTES # BLD AUTO: 0.02 X10(3)/MCL (ref 0–0.04)
IMM GRANULOCYTES NFR BLD AUTO: 0.3 %
INDIRECT COOMBS: NORMAL
INR PPP: 1
KETONES UR QL STRIP.AUTO: NEGATIVE
LEUKOCYTE ESTERASE UR QL STRIP.AUTO: 25
LYMPHOCYTES # BLD AUTO: 1.56 X10(3)/MCL (ref 0.6–4.6)
LYMPHOCYTES NFR BLD AUTO: 25.4 %
MCH RBC QN AUTO: 22.7 PG (ref 27–31)
MCHC RBC AUTO-ENTMCNC: 31.8 G/DL (ref 33–36)
MCV RBC AUTO: 71.2 FL (ref 80–94)
MONOCYTES # BLD AUTO: 0.58 X10(3)/MCL (ref 0.1–1.3)
MONOCYTES NFR BLD AUTO: 9.4 %
MUCOUS THREADS URNS QL MICRO: ABNORMAL /LPF
NEUTROPHILS # BLD AUTO: 3.66 X10(3)/MCL (ref 2.1–9.2)
NEUTROPHILS NFR BLD AUTO: 59.7 %
NITRITE UR QL STRIP.AUTO: NEGATIVE
NRBC BLD AUTO-RTO: 0 %
PH UR STRIP.AUTO: 5.5 [PH]
PLATELET # BLD AUTO: 390 X10(3)/MCL (ref 130–400)
PMV BLD AUTO: 9.6 FL (ref 7.4–10.4)
POTASSIUM SERPL-SCNC: 4.2 MMOL/L (ref 3.5–5.1)
PREALB SERPL-MCNC: 26.2 MG/DL (ref 16–38)
PROT SERPL-MCNC: 6.6 GM/DL (ref 6.4–8.3)
PROT UR QL STRIP.AUTO: NEGATIVE
PROTHROMBIN TIME: 12.8 SECONDS (ref 12.5–14.5)
RBC # BLD AUTO: 4.41 X10(6)/MCL (ref 4.2–5.4)
RBC #/AREA URNS AUTO: ABNORMAL /HPF
RBC UR QL AUTO: NEGATIVE
SODIUM SERPL-SCNC: 140 MMOL/L (ref 136–145)
SP GR UR STRIP.AUTO: 1.01 (ref 1–1.03)
SPECIMEN OUTDATE: NORMAL
SQUAMOUS #/AREA URNS LPF: ABNORMAL /HPF
UROBILINOGEN UR STRIP-ACNC: NORMAL
WBC # SPEC AUTO: 6.14 X10(3)/MCL (ref 4.5–11.5)
WBC #/AREA URNS AUTO: ABNORMAL /HPF

## 2024-02-05 PROCEDURE — 81001 URINALYSIS AUTO W/SCOPE: CPT | Performed by: EMERGENCY MEDICINE

## 2024-02-05 PROCEDURE — 85730 THROMBOPLASTIN TIME PARTIAL: CPT

## 2024-02-05 PROCEDURE — G0378 HOSPITAL OBSERVATION PER HR: HCPCS

## 2024-02-05 PROCEDURE — 25000003 PHARM REV CODE 250: Performed by: EMERGENCY MEDICINE

## 2024-02-05 PROCEDURE — 86901 BLOOD TYPING SEROLOGIC RH(D): CPT | Performed by: ORTHOPAEDIC SURGERY

## 2024-02-05 PROCEDURE — 85025 COMPLETE CBC W/AUTO DIFF WBC: CPT

## 2024-02-05 PROCEDURE — 80053 COMPREHEN METABOLIC PANEL: CPT

## 2024-02-05 PROCEDURE — 81025 URINE PREGNANCY TEST: CPT | Performed by: EMERGENCY MEDICINE

## 2024-02-05 PROCEDURE — 73590 X-RAY EXAM OF LOWER LEG: CPT | Mod: TC,RT

## 2024-02-05 PROCEDURE — 73610 X-RAY EXAM OF ANKLE: CPT | Mod: TC,RT

## 2024-02-05 PROCEDURE — 99285 EMERGENCY DEPT VISIT HI MDM: CPT | Mod: 25,27

## 2024-02-05 PROCEDURE — 84134 ASSAY OF PREALBUMIN: CPT | Performed by: ORTHOPAEDIC SURGERY

## 2024-02-05 PROCEDURE — 99024 POSTOP FOLLOW-UP VISIT: CPT | Mod: ,,, | Performed by: ORTHOPAEDIC SURGERY

## 2024-02-05 PROCEDURE — 11000001 HC ACUTE MED/SURG PRIVATE ROOM

## 2024-02-05 PROCEDURE — 85610 PROTHROMBIN TIME: CPT

## 2024-02-05 PROCEDURE — 82306 VITAMIN D 25 HYDROXY: CPT | Performed by: ORTHOPAEDIC SURGERY

## 2024-02-05 PROCEDURE — 99214 OFFICE O/P EST MOD 30 MIN: CPT | Mod: PBBFAC,25

## 2024-02-05 RX ORDER — ONDANSETRON HYDROCHLORIDE 2 MG/ML
4 INJECTION, SOLUTION INTRAVENOUS EVERY 6 HOURS PRN
Status: DISCONTINUED | OUTPATIENT
Start: 2024-02-05 | End: 2024-02-07 | Stop reason: HOSPADM

## 2024-02-05 RX ORDER — TALC
6 POWDER (GRAM) TOPICAL NIGHTLY PRN
Status: DISCONTINUED | OUTPATIENT
Start: 2024-02-05 | End: 2024-02-07 | Stop reason: HOSPADM

## 2024-02-05 RX ORDER — MORPHINE SULFATE 4 MG/ML
4 INJECTION, SOLUTION INTRAMUSCULAR; INTRAVENOUS EVERY 6 HOURS PRN
Status: DISCONTINUED | OUTPATIENT
Start: 2024-02-05 | End: 2024-02-07 | Stop reason: HOSPADM

## 2024-02-05 RX ORDER — OXYCODONE AND ACETAMINOPHEN 5; 325 MG/1; MG/1
1 TABLET ORAL EVERY 4 HOURS PRN
Status: DISCONTINUED | OUTPATIENT
Start: 2024-02-05 | End: 2024-02-06

## 2024-02-05 RX ORDER — SODIUM CHLORIDE 9 MG/ML
INJECTION, SOLUTION INTRAVENOUS CONTINUOUS
Status: DISCONTINUED | OUTPATIENT
Start: 2024-02-06 | End: 2024-02-07 | Stop reason: HOSPADM

## 2024-02-05 RX ORDER — OXYCODONE AND ACETAMINOPHEN 10; 325 MG/1; MG/1
1 TABLET ORAL EVERY 4 HOURS PRN
Status: DISCONTINUED | OUTPATIENT
Start: 2024-02-05 | End: 2024-02-06

## 2024-02-05 RX ORDER — FAMOTIDINE 20 MG/1
20 TABLET, FILM COATED ORAL 2 TIMES DAILY
Status: DISCONTINUED | OUTPATIENT
Start: 2024-02-05 | End: 2024-02-07 | Stop reason: HOSPADM

## 2024-02-05 RX ORDER — TALC
6 POWDER (GRAM) TOPICAL NIGHTLY PRN
Status: DISCONTINUED | OUTPATIENT
Start: 2024-02-05 | End: 2024-02-05

## 2024-02-05 RX ORDER — SODIUM CHLORIDE 0.9 % (FLUSH) 0.9 %
10 SYRINGE (ML) INJECTION
Status: DISCONTINUED | OUTPATIENT
Start: 2024-02-05 | End: 2024-02-07 | Stop reason: HOSPADM

## 2024-02-05 RX ORDER — ONDANSETRON HYDROCHLORIDE 2 MG/ML
4 INJECTION, SOLUTION INTRAVENOUS EVERY 8 HOURS PRN
Status: DISCONTINUED | OUTPATIENT
Start: 2024-02-05 | End: 2024-02-05

## 2024-02-05 RX ORDER — METHOCARBAMOL 750 MG/1
750 TABLET, FILM COATED ORAL 3 TIMES DAILY
Status: DISCONTINUED | OUTPATIENT
Start: 2024-02-05 | End: 2024-02-07 | Stop reason: HOSPADM

## 2024-02-05 RX ORDER — GABAPENTIN 300 MG/1
600 CAPSULE ORAL 3 TIMES DAILY
Status: DISCONTINUED | OUTPATIENT
Start: 2024-02-05 | End: 2024-02-07 | Stop reason: HOSPADM

## 2024-02-05 RX ADMIN — METHOCARBAMOL 750 MG: 750 TABLET ORAL at 10:02

## 2024-02-05 RX ADMIN — FAMOTIDINE 20 MG: 20 TABLET, FILM COATED ORAL at 10:02

## 2024-02-05 RX ADMIN — GABAPENTIN 600 MG: 300 CAPSULE ORAL at 10:02

## 2024-02-05 NOTE — LETTER
February 5, 2024      [facility]  [address] [place] [id number]  Phone: [phone]       Patient: Jasmin Moreno   YOB: 1980  Date of Visit: 02/05/2024    To Whom It May Concern:    Polina Moreno  was at [facility] on 02/05/2024. Please excuse Rebekah Narayanan from work today she had to escort her mother to this appointment.  If you have any questions or concerns, or if I can be of further assistance, please do not hesitate to contact me.    Sincerely,    Mabel Daly LPN

## 2024-02-05 NOTE — PROGRESS NOTES
Chief Complaint:   Chief Complaint   Patient presents with    Right Ankle - Post-op Evaluation     Right ankle surgery 1/26/24  external fixation device in place, no c/o pain at present       History of present illness:  44-year-old female presents today for evaluation follow-up after ex fix of her right ankle.  Patient has been compliant with nonweightbearing.  She has been elevated in the ankle.  She is here today for swelling check.  Patient was ex fixed on Friday.  Without complaints today.    Past Medical History:   Diagnosis Date    Fractures        Past Surgical History:   Procedure Laterality Date    APPLICATION, EXTERNAL FIXATION DEVICE Right 1/26/2024    Procedure: APPLICATION, EXTERNAL FIXATION DEVICE;  Surgeon: Tim Rashid MD;  Location: [facility];  Service: Orthopedics;  Laterality: Right;    none         Current Outpatient Medications   Medication Sig    aspirin (ECOTRIN) 81 MG EC tablet Take 1 tablet (81 mg total) by mouth 2 (two) times a day.    gabapentin (NEURONTIN) 600 MG tablet Take 1 tablet (600 mg total) by mouth 3 (three) times daily.    HYDROcodone-acetaminophen (NORCO)  mg per tablet Take 1 tablet by mouth every 6 (six) hours as needed for Pain.    ibuprofen (ADVIL,MOTRIN) 800 MG tablet Take 1 tablet (800 mg total) by mouth 3 (three) times daily.    methocarbamoL (ROBAXIN) 750 MG Tab Take 1 tablet (750 mg total) by mouth 4 (four) times daily. for 10 days     No current facility-administered medications for this visit.     Facility-Administered Medications Ordered in Other Visits   Medication    diphenhydrAMINE injection 6.25 mg    droPERidol injection 0.25 mg    HYDROmorphone injection 0.2 mg    LIDOcaine (PF) 10 mg/ml (1%) injection 10 mg    metoclopramide injection 10 mg    oxyCODONE immediate release tablet 5 mg       Review of patient's allergies indicates:  No Known Allergies    Family History   Problem Relation Age of Onset    No Known Problems Mother     No Known Problems  Father        Social History     Socioeconomic History    Marital status: Single   Tobacco Use    Smoking status: Never    Smokeless tobacco: Never   Substance and Sexual Activity    Alcohol use: Never    Drug use: Never    Sexual activity: Not Currently     Partners: Male     Birth control/protection: None           Review of Systems:    Constitution: Negative for chills, fever, and sweats.  Negative for unexplained weight loss.    HENT:  Negative for headaches and blurry vision.    Cardiovascular:Negative for chest pain or irregular heart beat. Negative for hypertension.    Respiratory:  Negative for cough and shortness of breath.    Gastrointestinal: Negative for abdominal pain, heartburn, melena, nausea, and vomitting.    Genitourinary:  Negative bladder incontinence and dysuria.    Musculoskeletal:  See HPI    Neurological: Negative for numbness.    Psychiatric/Behavioral: Negative for depression.  The patient is not nervous/anxious.      Endocrine: Negative for polyuria    Hematologic/Lymphatic: Negative for bleeding problem.  Does not bruise/bleed easily.    Skin: Negative for poor would healing and rash      Physical Examination:    Vital Signs:    Vitals:    02/05/24 0851   BP: 126/84   Pulse: 95   Resp: 20   Temp: 100 °F (37.8 °C)       Body mass index is 20.83 kg/m².    General: No acute distress, alert and oriented, healthy appearing    HEENT: Head is atraumatic, mucous membranes are moist    Neck: Supples, no JVD    Cardiovascular: Palpable dorsalis pedis and posterior tibial pulses, regular rate and rhythm to those pulses    Lungs: Breathing non-labored    Skin: no rashes appreciated    Neurologic: Can flex and extend knees, ankles, and toes. Sensation is grossly intact    Right ankle:  Positive FHL EHL.  Brisk cap refill disappeared actually we placed.  No drainage.  Sensation intact distally.  No wrinkling to anterior-posterior tibia    X-rays:  Three views right ankle reviewed.  Patient with  intra-articular pilon fracture of the right ankle.  She was somewhat shortened.  She is subluxed posteriorly.     Assessment::  Status post ex fix right ankle    Plan:  Discussed all treatment with the patient.  This point, she is still significantly swollen.  She is somewhat short given the chronicity of her fracture.  It was discussed this case with 1 of my trauma colleagues.  He recommended revision of the ex fix.  Recommended she presented to the emergency department at [facility] for admission and ORIF versus ex fix revision tomorrow.  This is discussed in detail with the patient.  Plan for discharge from the office and go to the emergency department at [facility] for plan to admission and surgery tomorrow     This note was created using [employer] voice recognition software that occasionally misinterpreted phrases or words.    Consult note is delivered via Epic messaging service.

## 2024-02-06 ENCOUNTER — ANESTHESIA EVENT (OUTPATIENT)
Dept: SURGERY | Facility: HOSPITAL | Age: 44
DRG: 494 | End: 2024-02-06
Payer: MEDICAID

## 2024-02-06 ENCOUNTER — ANESTHESIA (OUTPATIENT)
Dept: SURGERY | Facility: HOSPITAL | Age: 44
DRG: 494 | End: 2024-02-06
Payer: MEDICAID

## 2024-02-06 PROBLEM — S82.871A CLOSED DISPLACED PILON FRACTURE OF RIGHT TIBIA: Status: ACTIVE | Noted: 2024-02-06

## 2024-02-06 PROCEDURE — 63600175 PHARM REV CODE 636 W HCPCS: Performed by: NURSE ANESTHETIST, CERTIFIED REGISTERED

## 2024-02-06 PROCEDURE — 25000003 PHARM REV CODE 250: Performed by: NURSE ANESTHETIST, CERTIFIED REGISTERED

## 2024-02-06 PROCEDURE — 99223 1ST HOSP IP/OBS HIGH 75: CPT | Mod: 57,,, | Performed by: ORTHOPAEDIC SURGERY

## 2024-02-06 PROCEDURE — 93010 ELECTROCARDIOGRAM REPORT: CPT | Mod: ,,, | Performed by: INTERNAL MEDICINE

## 2024-02-06 PROCEDURE — 71000039 HC RECOVERY, EACH ADD'L HOUR: Performed by: ORTHOPAEDIC SURGERY

## 2024-02-06 PROCEDURE — 27828 TREAT LOWER LEG FRACTURE: CPT | Mod: 58,RT,, | Performed by: ORTHOPAEDIC SURGERY

## 2024-02-06 PROCEDURE — G0378 HOSPITAL OBSERVATION PER HR: HCPCS

## 2024-02-06 PROCEDURE — 96361 HYDRATE IV INFUSION ADD-ON: CPT

## 2024-02-06 PROCEDURE — D9220A PRA ANESTHESIA: Mod: CRNA,,, | Performed by: NURSE ANESTHETIST, CERTIFIED REGISTERED

## 2024-02-06 PROCEDURE — 37000008 HC ANESTHESIA 1ST 15 MINUTES: Performed by: ORTHOPAEDIC SURGERY

## 2024-02-06 PROCEDURE — 36000708 HC OR TIME LEV III 1ST 15 MIN: Performed by: ORTHOPAEDIC SURGERY

## 2024-02-06 PROCEDURE — 64450 NJX AA&/STRD OTHER PN/BRANCH: CPT | Mod: 59,RT,, | Performed by: ANESTHESIOLOGY

## 2024-02-06 PROCEDURE — 0QPGX5Z REMOVAL OF EXTERNAL FIXATION DEVICE FROM RIGHT TIBIA, EXTERNAL APPROACH: ICD-10-PCS | Performed by: ORTHOPAEDIC SURGERY

## 2024-02-06 PROCEDURE — 71000033 HC RECOVERY, INTIAL HOUR: Performed by: ORTHOPAEDIC SURGERY

## 2024-02-06 PROCEDURE — 93005 ELECTROCARDIOGRAM TRACING: CPT

## 2024-02-06 PROCEDURE — 63600175 PHARM REV CODE 636 W HCPCS: Performed by: ORTHOPAEDIC SURGERY

## 2024-02-06 PROCEDURE — 36000709 HC OR TIME LEV III EA ADD 15 MIN: Performed by: ORTHOPAEDIC SURGERY

## 2024-02-06 PROCEDURE — 25000003 PHARM REV CODE 250: Performed by: EMERGENCY MEDICINE

## 2024-02-06 PROCEDURE — 0QSG04Z REPOSITION RIGHT TIBIA WITH INTERNAL FIXATION DEVICE, OPEN APPROACH: ICD-10-PCS | Performed by: ORTHOPAEDIC SURGERY

## 2024-02-06 PROCEDURE — 37000009 HC ANESTHESIA EA ADD 15 MINS: Performed by: ORTHOPAEDIC SURGERY

## 2024-02-06 PROCEDURE — C1713 ANCHOR/SCREW BN/BN,TIS/BN: HCPCS | Performed by: ORTHOPAEDIC SURGERY

## 2024-02-06 PROCEDURE — 63600175 PHARM REV CODE 636 W HCPCS: Performed by: PHYSICIAN ASSISTANT

## 2024-02-06 PROCEDURE — 27828 TREAT LOWER LEG FRACTURE: CPT | Mod: 58,AS,RT, | Performed by: PHYSICIAN ASSISTANT

## 2024-02-06 PROCEDURE — 71000015 HC POSTOP RECOV 1ST HR: Performed by: ORTHOPAEDIC SURGERY

## 2024-02-06 PROCEDURE — 96372 THER/PROPH/DIAG INJ SC/IM: CPT | Mod: 59 | Performed by: PHYSICIAN ASSISTANT

## 2024-02-06 PROCEDURE — 11000001 HC ACUTE MED/SURG PRIVATE ROOM

## 2024-02-06 PROCEDURE — 96375 TX/PRO/DX INJ NEW DRUG ADDON: CPT

## 2024-02-06 PROCEDURE — 20694 RMVL EXT FIXJ SYS UNDER ANES: CPT | Mod: 58,51,RT, | Performed by: ORTHOPAEDIC SURGERY

## 2024-02-06 PROCEDURE — 27201423 OPTIME MED/SURG SUP & DEVICES STERILE SUPPLY: Performed by: ORTHOPAEDIC SURGERY

## 2024-02-06 PROCEDURE — 76942 ECHO GUIDE FOR BIOPSY: CPT | Mod: 26,,, | Performed by: ANESTHESIOLOGY

## 2024-02-06 PROCEDURE — 96365 THER/PROPH/DIAG IV INF INIT: CPT

## 2024-02-06 PROCEDURE — D9220A PRA ANESTHESIA: Mod: ANES,,, | Performed by: ANESTHESIOLOGY

## 2024-02-06 PROCEDURE — 63600175 PHARM REV CODE 636 W HCPCS: Performed by: ANESTHESIOLOGY

## 2024-02-06 PROCEDURE — 64445 NJX AA&/STRD SCIATIC NRV IMG: CPT | Performed by: ANESTHESIOLOGY

## 2024-02-06 DEVICE — PIN TRANFX EXFIX 6X225: Type: IMPLANTABLE DEVICE | Site: ANKLE | Status: FUNCTIONAL

## 2024-02-06 DEVICE — SCREW STRDRV REC T8 2.4X12 SS: Type: IMPLANTABLE DEVICE | Site: ANKLE | Status: FUNCTIONAL

## 2024-02-06 DEVICE — SCREW BONE VA LK 2.7X34MM: Type: IMPLANTABLE DEVICE | Site: ANKLE | Status: FUNCTIONAL

## 2024-02-06 DEVICE — SCREW CORTEX 3.5 X 24: Type: IMPLANTABLE DEVICE | Site: ANKLE | Status: FUNCTIONAL

## 2024-02-06 DEVICE — IMPLANTABLE DEVICE: Type: IMPLANTABLE DEVICE | Site: ANKLE | Status: FUNCTIONAL

## 2024-02-06 DEVICE — SCREW CAN 4.0MMX24MM: Type: IMPLANTABLE DEVICE | Site: ANKLE | Status: FUNCTIONAL

## 2024-02-06 DEVICE — SCREW CANCL 4.0MM 14MM: Type: IMPLANTABLE DEVICE | Site: ANKLE | Status: FUNCTIONAL

## 2024-02-06 DEVICE — SCREW BONE LOCK VA 2.7X26MM: Type: IMPLANTABLE DEVICE | Site: ANKLE | Status: FUNCTIONAL

## 2024-02-06 DEVICE — PLATE LCP 7 HOLE: Type: IMPLANTABLE DEVICE | Site: ANKLE | Status: FUNCTIONAL

## 2024-02-06 DEVICE — SCREW LOCKING 3.5MM X 12MM: Type: IMPLANTABLE DEVICE | Site: ANKLE | Status: FUNCTIONAL

## 2024-02-06 DEVICE — SCREW LOCKING 3.5 X 10: Type: IMPLANTABLE DEVICE | Site: ANKLE | Status: FUNCTIONAL

## 2024-02-06 DEVICE — SCREW CORTEX 3.5 X 22: Type: IMPLANTABLE DEVICE | Site: ANKLE | Status: FUNCTIONAL

## 2024-02-06 DEVICE — SCREW STRDRV REC T8 2.4X14 SS: Type: IMPLANTABLE DEVICE | Site: ANKLE | Status: FUNCTIONAL

## 2024-02-06 DEVICE — SCREW 2.7MM X 16 LOCKING ANGLE: Type: IMPLANTABLE DEVICE | Site: ANKLE | Status: FUNCTIONAL

## 2024-02-06 DEVICE — SCREW LOCKING 3.5MM X 14MM.: Type: IMPLANTABLE DEVICE | Site: ANKLE | Status: FUNCTIONAL

## 2024-02-06 DEVICE — SCREW STRDRV REC T8 2.4X18 SS: Type: IMPLANTABLE DEVICE | Site: ANKLE | Status: FUNCTIONAL

## 2024-02-06 DEVICE — SCREW CANCL 4.0MM 12MM: Type: IMPLANTABLE DEVICE | Site: ANKLE | Status: FUNCTIONAL

## 2024-02-06 DEVICE — SCREW CANCELLOUS FT 4MM X 20MM: Type: IMPLANTABLE DEVICE | Site: ANKLE | Status: FUNCTIONAL

## 2024-02-06 DEVICE — PLATE W COLLAR: Type: IMPLANTABLE DEVICE | Site: ANKLE | Status: FUNCTIONAL

## 2024-02-06 DEVICE — SCREW BONE VA LK 2.7X28MM: Type: IMPLANTABLE DEVICE | Site: ANKLE | Status: FUNCTIONAL

## 2024-02-06 RX ORDER — ACETAMINOPHEN 10 MG/ML
1000 INJECTION, SOLUTION INTRAVENOUS ONCE
Status: DISCONTINUED | OUTPATIENT
Start: 2024-02-06 | End: 2024-02-06 | Stop reason: HOSPADM

## 2024-02-06 RX ORDER — CEFAZOLIN SODIUM 2 G/50ML
2 SOLUTION INTRAVENOUS
Status: COMPLETED | OUTPATIENT
Start: 2024-02-06 | End: 2024-02-07

## 2024-02-06 RX ORDER — KETOROLAC TROMETHAMINE 30 MG/ML
15 INJECTION, SOLUTION INTRAMUSCULAR; INTRAVENOUS EVERY 6 HOURS
Status: COMPLETED | OUTPATIENT
Start: 2024-02-06 | End: 2024-02-07

## 2024-02-06 RX ORDER — POLYETHYLENE GLYCOL 3350 17 G/17G
17 POWDER, FOR SOLUTION ORAL DAILY
Status: DISCONTINUED | OUTPATIENT
Start: 2024-02-07 | End: 2024-02-07 | Stop reason: HOSPADM

## 2024-02-06 RX ORDER — SODIUM CHLORIDE, SODIUM GLUCONATE, SODIUM ACETATE, POTASSIUM CHLORIDE AND MAGNESIUM CHLORIDE 30; 37; 368; 526; 502 MG/100ML; MG/100ML; MG/100ML; MG/100ML; MG/100ML
INJECTION, SOLUTION INTRAVENOUS CONTINUOUS
Status: CANCELLED | OUTPATIENT
Start: 2024-02-06 | End: 2024-03-07

## 2024-02-06 RX ORDER — CEFAZOLIN SODIUM 2 G/50ML
2 SOLUTION INTRAVENOUS ONCE
Status: COMPLETED | OUTPATIENT
Start: 2024-02-06 | End: 2024-02-06

## 2024-02-06 RX ORDER — ENOXAPARIN SODIUM 100 MG/ML
40 INJECTION SUBCUTANEOUS EVERY 24 HOURS
Status: DISCONTINUED | OUTPATIENT
Start: 2024-02-06 | End: 2024-02-07 | Stop reason: HOSPADM

## 2024-02-06 RX ORDER — HYDROMORPHONE HYDROCHLORIDE 2 MG/ML
0.2 INJECTION, SOLUTION INTRAMUSCULAR; INTRAVENOUS; SUBCUTANEOUS EVERY 5 MIN PRN
Status: DISCONTINUED | OUTPATIENT
Start: 2024-02-06 | End: 2024-02-06 | Stop reason: HOSPADM

## 2024-02-06 RX ORDER — GLYCOPYRROLATE 0.2 MG/ML
INJECTION INTRAMUSCULAR; INTRAVENOUS
Status: DISCONTINUED | OUTPATIENT
Start: 2024-02-06 | End: 2024-02-06

## 2024-02-06 RX ORDER — LIDOCAINE HYDROCHLORIDE 10 MG/ML
1 INJECTION, SOLUTION EPIDURAL; INFILTRATION; INTRACAUDAL; PERINEURAL ONCE
Status: CANCELLED | OUTPATIENT
Start: 2024-02-06 | End: 2024-02-06

## 2024-02-06 RX ORDER — DIPHENHYDRAMINE HYDROCHLORIDE 50 MG/ML
25 INJECTION INTRAMUSCULAR; INTRAVENOUS EVERY 6 HOURS PRN
Status: DISCONTINUED | OUTPATIENT
Start: 2024-02-06 | End: 2024-02-06 | Stop reason: HOSPADM

## 2024-02-06 RX ORDER — ROCURONIUM BROMIDE 10 MG/ML
INJECTION, SOLUTION INTRAVENOUS
Status: DISCONTINUED | OUTPATIENT
Start: 2024-02-06 | End: 2024-02-06

## 2024-02-06 RX ORDER — MIDAZOLAM HYDROCHLORIDE 1 MG/ML
2 INJECTION INTRAMUSCULAR; INTRAVENOUS ONCE AS NEEDED
Status: CANCELLED | OUTPATIENT
Start: 2024-02-06 | End: 2035-07-05

## 2024-02-06 RX ORDER — OXYCODONE HYDROCHLORIDE 5 MG/1
5 TABLET ORAL EVERY 6 HOURS PRN
Status: DISCONTINUED | OUTPATIENT
Start: 2024-02-06 | End: 2024-02-07 | Stop reason: HOSPADM

## 2024-02-06 RX ORDER — ROPIVACAINE HYDROCHLORIDE 5 MG/ML
50 INJECTION, SOLUTION EPIDURAL; INFILTRATION; PERINEURAL ONCE
Status: CANCELLED | OUTPATIENT
Start: 2024-02-06

## 2024-02-06 RX ORDER — ROPIVACAINE HYDROCHLORIDE 5 MG/ML
INJECTION, SOLUTION EPIDURAL; INFILTRATION; PERINEURAL
Status: COMPLETED
Start: 2024-02-06 | End: 2024-02-06

## 2024-02-06 RX ORDER — FENTANYL CITRATE 50 UG/ML
INJECTION, SOLUTION INTRAMUSCULAR; INTRAVENOUS
Status: DISCONTINUED | OUTPATIENT
Start: 2024-02-06 | End: 2024-02-06

## 2024-02-06 RX ORDER — PHENYLEPHRINE HCL IN 0.9% NACL 1 MG/10 ML
SYRINGE (ML) INTRAVENOUS
Status: DISCONTINUED | OUTPATIENT
Start: 2024-02-06 | End: 2024-02-06

## 2024-02-06 RX ORDER — ACETAMINOPHEN 10 MG/ML
INJECTION, SOLUTION INTRAVENOUS
Status: DISCONTINUED | OUTPATIENT
Start: 2024-02-06 | End: 2024-02-06

## 2024-02-06 RX ORDER — PROPOFOL 10 MG/ML
VIAL (ML) INTRAVENOUS
Status: DISCONTINUED | OUTPATIENT
Start: 2024-02-06 | End: 2024-02-06

## 2024-02-06 RX ORDER — ONDANSETRON HYDROCHLORIDE 2 MG/ML
4 INJECTION, SOLUTION INTRAVENOUS DAILY PRN
Status: DISCONTINUED | OUTPATIENT
Start: 2024-02-06 | End: 2024-02-06 | Stop reason: HOSPADM

## 2024-02-06 RX ORDER — LIDOCAINE HYDROCHLORIDE 20 MG/ML
INJECTION, SOLUTION EPIDURAL; INFILTRATION; INTRACAUDAL; PERINEURAL
Status: DISCONTINUED | OUTPATIENT
Start: 2024-02-06 | End: 2024-02-06

## 2024-02-06 RX ORDER — OXYCODONE HYDROCHLORIDE 10 MG/1
10 TABLET ORAL EVERY 4 HOURS PRN
Status: DISCONTINUED | OUTPATIENT
Start: 2024-02-06 | End: 2024-02-07 | Stop reason: HOSPADM

## 2024-02-06 RX ORDER — MIDAZOLAM HYDROCHLORIDE 1 MG/ML
INJECTION INTRAMUSCULAR; INTRAVENOUS
Status: COMPLETED
Start: 2024-02-06 | End: 2024-02-06

## 2024-02-06 RX ORDER — ROPIVACAINE HYDROCHLORIDE 5 MG/ML
INJECTION, SOLUTION EPIDURAL; INFILTRATION; PERINEURAL
Status: COMPLETED | OUTPATIENT
Start: 2024-02-06 | End: 2024-02-06

## 2024-02-06 RX ORDER — DEXAMETHASONE SODIUM PHOSPHATE 4 MG/ML
INJECTION, SOLUTION INTRA-ARTICULAR; INTRALESIONAL; INTRAMUSCULAR; INTRAVENOUS; SOFT TISSUE
Status: DISCONTINUED | OUTPATIENT
Start: 2024-02-06 | End: 2024-02-06

## 2024-02-06 RX ORDER — VANCOMYCIN HYDROCHLORIDE 1 G/20ML
INJECTION, POWDER, LYOPHILIZED, FOR SOLUTION INTRAVENOUS
Status: DISCONTINUED | OUTPATIENT
Start: 2024-02-06 | End: 2024-02-06 | Stop reason: HOSPADM

## 2024-02-06 RX ORDER — ONDANSETRON HYDROCHLORIDE 2 MG/ML
INJECTION, SOLUTION INTRAVENOUS
Status: DISCONTINUED | OUTPATIENT
Start: 2024-02-06 | End: 2024-02-06

## 2024-02-06 RX ORDER — MIDAZOLAM HYDROCHLORIDE 1 MG/ML
INJECTION INTRAMUSCULAR; INTRAVENOUS
Status: DISCONTINUED | OUTPATIENT
Start: 2024-02-06 | End: 2024-02-06

## 2024-02-06 RX ORDER — EPHEDRINE SULFATE 50 MG/ML
INJECTION, SOLUTION INTRAVENOUS
Status: DISCONTINUED | OUTPATIENT
Start: 2024-02-06 | End: 2024-02-06

## 2024-02-06 RX ADMIN — MIDAZOLAM HYDROCHLORIDE 2 MG: 1 INJECTION, SOLUTION INTRAMUSCULAR; INTRAVENOUS at 01:02

## 2024-02-06 RX ADMIN — SODIUM CHLORIDE: 9 INJECTION, SOLUTION INTRAVENOUS at 12:02

## 2024-02-06 RX ADMIN — Medication 100 MCG: at 02:02

## 2024-02-06 RX ADMIN — GLYCOPYRROLATE 0.2 MG: 0.2 INJECTION INTRAMUSCULAR; INTRAVENOUS at 01:02

## 2024-02-06 RX ADMIN — ROCURONIUM BROMIDE 50 MG: 10 SOLUTION INTRAVENOUS at 01:02

## 2024-02-06 RX ADMIN — SODIUM CHLORIDE: 9 INJECTION, SOLUTION INTRAVENOUS at 09:02

## 2024-02-06 RX ADMIN — Medication 100 MCG: at 03:02

## 2024-02-06 RX ADMIN — GABAPENTIN 600 MG: 300 CAPSULE ORAL at 09:02

## 2024-02-06 RX ADMIN — METHOCARBAMOL 750 MG: 750 TABLET ORAL at 09:02

## 2024-02-06 RX ADMIN — SUGAMMADEX 200 MG: 100 INJECTION, SOLUTION INTRAVENOUS at 04:02

## 2024-02-06 RX ADMIN — SODIUM CHLORIDE, SODIUM GLUCONATE, SODIUM ACETATE, POTASSIUM CHLORIDE AND MAGNESIUM CHLORIDE: 526; 502; 368; 37; 30 INJECTION, SOLUTION INTRAVENOUS at 02:02

## 2024-02-06 RX ADMIN — ENOXAPARIN SODIUM 40 MG: 40 INJECTION SUBCUTANEOUS at 09:02

## 2024-02-06 RX ADMIN — KETOROLAC TROMETHAMINE 15 MG: 30 INJECTION, SOLUTION INTRAMUSCULAR; INTRAVENOUS at 06:02

## 2024-02-06 RX ADMIN — FAMOTIDINE 20 MG: 20 TABLET, FILM COATED ORAL at 09:02

## 2024-02-06 RX ADMIN — LIDOCAINE HYDROCHLORIDE 4 ML: 20 INJECTION, SOLUTION EPIDURAL; INFILTRATION; INTRACAUDAL; PERINEURAL at 01:02

## 2024-02-06 RX ADMIN — SODIUM CHLORIDE: 9 INJECTION, SOLUTION INTRAVENOUS at 10:02

## 2024-02-06 RX ADMIN — SODIUM CHLORIDE, SODIUM GLUCONATE, SODIUM ACETATE, POTASSIUM CHLORIDE AND MAGNESIUM CHLORIDE: 526; 502; 368; 37; 30 INJECTION, SOLUTION INTRAVENOUS at 01:02

## 2024-02-06 RX ADMIN — PROPOFOL 120 MG: 10 INJECTION, EMULSION INTRAVENOUS at 01:02

## 2024-02-06 RX ADMIN — ROPIVACAINE HYDROCHLORIDE 30 ML: 5 INJECTION, SOLUTION EPIDURAL; INFILTRATION; PERINEURAL at 01:02

## 2024-02-06 RX ADMIN — DEXAMETHASONE SODIUM PHOSPHATE 4 MG: 4 INJECTION, SOLUTION INTRA-ARTICULAR; INTRALESIONAL; INTRAMUSCULAR; INTRAVENOUS; SOFT TISSUE at 01:02

## 2024-02-06 RX ADMIN — ONDANSETRON 4 MG: 2 INJECTION INTRAMUSCULAR; INTRAVENOUS at 01:02

## 2024-02-06 RX ADMIN — CEFAZOLIN SODIUM 2 G: 2 SOLUTION INTRAVENOUS at 09:02

## 2024-02-06 RX ADMIN — EPHEDRINE SULFATE 10 MG: 50 INJECTION INTRAVENOUS at 03:02

## 2024-02-06 RX ADMIN — FENTANYL CITRATE 100 MCG: 50 INJECTION, SOLUTION INTRAMUSCULAR; INTRAVENOUS at 01:02

## 2024-02-06 RX ADMIN — ROCURONIUM BROMIDE 20 MG: 10 SOLUTION INTRAVENOUS at 03:02

## 2024-02-06 RX ADMIN — CEFAZOLIN SODIUM 2 G: 2 SOLUTION INTRAVENOUS at 01:02

## 2024-02-06 RX ADMIN — ACETAMINOPHEN 1000 MG: 10 INJECTION, SOLUTION INTRAVENOUS at 01:02

## 2024-02-06 NOTE — PLAN OF CARE
Pt is single, one child, no living will or POA. No PCP.    02/06/24 1034   Discharge Assessment   Assessment Type Discharge Planning Assessment   Confirmed/corrected address, phone number and insurance Yes   Confirmed Demographics Correct on Facesheet   Source of Information patient   When was your last doctors appointment?   (no pcp)   Communicated MAXIMILIANO with patient/caregiver Date not available/Unable to determine   People in Home parent(s)   Do you expect to return to your current living situation? Yes   Do you have help at home or someone to help you manage your care at home? Yes   Who are your caregiver(s) and their phone number(s)? lives with parents. Daughter identified as support Rebekah Narayanan [phone]   Prior to hospitilization cognitive status: Unable to Assess   Current cognitive status: Alert/Oriented   Walking or Climbing Stairs Difficulty yes  (none prior to admit)   Walking or Climbing Stairs ambulation difficulty, requires equipment   Mobility Management was using her fathers standard walker   Dressing/Bathing Difficulty no  (none prior to admit)   Home Accessibility stairs to enter home   Number of Stairs, Main Entrance four   Home Layout Able to live on 1st floor   Equipment Currently Used at Home walker, standard   Readmission within 30 days? No   Patient currently being followed by outpatient case management? No   Do you currently have service(s) that help you manage your care at home? No   Do you take prescription medications? No   Do you have prescription coverage?   (pt reports having medicaid but listed as self pay)   Do you have any problems affording any of your prescribed medications? TBD   Is the patient taking medications as prescribed? yes   Who is going to help you get home at discharge? daughter   How do you get to doctors appointments? car, drives self;family or friend will provide   Are you on dialysis? No   Do you take coumadin? No   Discharge Plan A Other  (TBD)   DME Needed Upon  Discharge  other (see comments)  (TBD)   Discharge Plan discussed with: Patient   Transition of Care Barriers None   Physical Activity   On average, how many days per week do you engage in moderate to strenuous exercise (like a brisk walk)? 3 days   On average, how many minutes do you engage in exercise at this level? 20 min   Financial Resource Strain   How hard is it for you to pay for the very basics like food, housing, medical care, and heating? Not very  (worries about post surgery due to inability to work)   Housing Stability   In the last 12 months, was there a time when you were not able to pay the mortgage or rent on time? N   In the last 12 months, how many places have you lived? 1   In the last 12 months, was there a time when you did not have a steady place to sleep or slept in a shelter (including now)? N   Transportation Needs   In the past 12 months, has lack of transportation kept you from medical appointments or from getting medications? no   In the past 12 months, has lack of transportation kept you from meetings, work, or from getting things needed for daily living? No   Food Insecurity   Within the past 12 months, you worried that your food would run out before you got the money to buy more. Never true   Within the past 12 months, the food you bought just didn't last and you didn't have money to get more. Never true   Stress   Do you feel stress - tense, restless, nervous, or anxious, or unable to sleep at night because your mind is troubled all the time - these days? To some exte  (due to finances and inability to work after surgery)   Social Connections   In a typical week, how many times do you talk on the phone with family, friends, or neighbors? More than 3   How often do you get together with friends or relatives? More than 3   How often do you attend Yazdanism or Druze services? More than 4   Do you belong to any clubs or organizations such as Yazdanism groups, unions, fraternal or athletic  groups, or school groups? No   How often do you attend meetings of the clubs or organizations you belong to? Never   Are you , , , , never , or living with a partner? Never marrie   Alcohol Use   Q1: How often do you have a drink containing alcohol? Never   Q2: How many drinks containing alcohol do you have on a typical day when you are drinking? None   Q3: How often do you have six or more drinks on one occasion? Never   OTHER   Name(s) of People in Home parents

## 2024-02-06 NOTE — TRANSFER OF CARE
"Anesthesia Transfer of Care Note    Patient: Jasmin Moreno    Procedure(s) Performed: Procedure(s) (LRB):  ORIF, ANKLE (Right)    Patient location: PACU    Anesthesia Type: general    Transport from OR: Transported from OR on room air with adequate spontaneous ventilation    Post pain: adequate analgesia    Post assessment: no apparent anesthetic complications    Post vital signs: stable    Level of consciousness: awake and alert    Nausea/Vomiting: no nausea/vomiting    Complications: none    Transfer of care protocol was followed    Last vitals: Visit Vitals  /85   Pulse 86   Temp 37.1 °C (98.7 °F) (Oral)   Resp 15   Ht 5' 7" (1.702 m)   Wt 59.9 kg (132 lb)   LMP 01/15/2024   SpO2 99%   BMI 20.67 kg/m²     "

## 2024-02-06 NOTE — ANESTHESIA PROCEDURE NOTES
Peripheral Block    Patient location during procedure: pre-op   Block not for primary anesthetic.  Reason for block: at surgeon's request and post-op pain management   Post-op Pain Location: right   Start time: 2/6/2024 1:19 PM  Timeout: 2/6/2024 1:18 PM   End time: 2/6/2024 1:24 PM    Staffing  Authorizing Provider: Solitario Gloria MD  Performing Provider: Solitario Gloria MD    Staffing  Performed by: Solitario Gloria MD  Authorized by: Solitario Gloria MD    Preanesthetic Checklist  Completed: patient identified, IV checked, site marked, risks and benefits discussed, surgical consent, monitors and equipment checked, pre-op evaluation and timeout performed  Peripheral Block  Patient position: supine  Prep: ChloraPrep  Patient monitoring: heart rate, cardiac monitor, continuous pulse ox, continuous capnometry and frequent blood pressure checks  Block type: popliteal  Laterality: right  Injection technique: single shot  Needle  Needle type: Stimuplex   Needle gauge: 21 G  Needle length: 4 in  Needle localization: anatomical landmarks and ultrasound guidance   -ultrasound image captured on disc.  Assessment  Injection assessment: negative aspiration, negative parasthesia and local visualized surrounding nerve  Paresthesia pain: none  Heart rate change: no  Slow fractionated injection: yes    Medications:    Medications: ropivacaine (NAROPIN) injection 0.5% - Perineural   30 mL - 2/6/2024 1:25:00 PM    Additional Notes  VSS.  [facility] RN monitoring vitals throughout procedure.  Patient tolerated procedure well.

## 2024-02-06 NOTE — ANESTHESIA PREPROCEDURE EVALUATION
02/06/2024  Jasmin Moreno is a 44 y.o., female without significant PMHx now with  right distal tibia and fibula fracture, s/p application of Ex Fix.  To OR today for ORIF.  She did well recently under general anesthesia for ex fix.      Pre-op Assessment    I have reviewed the Patient Summary Reports.     I have reviewed the Nursing Notes. I have reviewed the NPO Status.   I have reviewed the Medications.     Review of Systems  Anesthesia Hx:  No problems with previous Anesthesia             Denies Family Hx of Anesthesia complications.    Denies Personal Hx of Anesthesia complications.                    Hematology/Oncology:  Hematology Normal   Oncology Normal                                   EENT/Dental:  EENT/Dental Normal    Ears General/Symptom(s)    Ear Disease:  Tympanic Membrane Problem        Cardiovascular:  Cardiovascular Normal Exercise tolerance: good                                           Pulmonary:  Pulmonary Normal                       Renal/:  Renal/ Normal                 Hepatic/GI:  Hepatic/GI Normal                 Musculoskeletal:  Musculoskeletal Normal                Neurological:  Neurology Normal                                      Endocrine:  Endocrine Normal            Dermatological:  Skin Normal    Psych:  Psychiatric Normal                    Physical Exam  General: Well nourished    Airway:  Mallampati: II   Mouth Opening: Normal  TM Distance: Normal  Tongue: Normal  Neck ROM: Normal ROM    Dental:  Intact    Chest/Lungs:  Clear to auscultation, Normal Respiratory Rate    Heart:  Rate: Normal  Rhythm: Regular Rhythm        Anesthesia Plan  Type of Anesthesia, risks & benefits discussed:    Anesthesia Type: Gen ETT  Intra-op Monitoring Plan: Standard ASA Monitors  Post Op Pain Control Plan: multimodal analgesia and IV/PO Opioids PRN  Induction:  IV  Informed  Consent: Informed consent signed with the Patient and all parties understand the risks and agree with anesthesia plan.  All questions answered.   ASA Score: 1  Day of Surgery Review of History & Physical: H&P Update referred to the surgeon/provider.I have interviewed and examined the patient. I have reviewed the patient's H&P dated: There are no significant changes. H&P completed by Anesthesiologist.    Ready For Surgery From Anesthesia Perspective.     .

## 2024-02-06 NOTE — PROGRESS NOTES
Pt has Right pilon fracture with failed Ex fix due to severity of fracture. She has presented late following her injury and needs surgical stabilization nearing 4 weeks out from her original injury originally seen at outside hospital with outpt followup.    NWB  Admit to medicine  NPO midnight  OR tomorrow vs Weds    This note/OR report was created with the assistance of  voice recognition software or phone  dictation.  There may be transcription errors as a result of using this technology however minimal. Effort has been made to assure accuracy of transcription but any obvious errors or omissions should be clarified with the author of the document.       Ata Prado, DO  Orthopedic Trauma Surgery .

## 2024-02-06 NOTE — ANESTHESIA PROCEDURE NOTES
Intubation    Date/Time: 2/6/2024 1:31 PM    Performed by: Giuliano Leiva CRNA  Authorized by: Solitario Gloria MD    Intubation:     Induction:  Intravenous    Intubated:  Postinduction    Mask Ventilation:  Easy with oral airway    Attempts:  1    Attempted By:  CRNA    Method of Intubation:  Direct    Blade:  Astudillo 2    Laryngeal View Grade: Grade IIA - cords partially seen      Difficult Airway Encountered?: No      Complications:  None    Airway Device:  Oral endotracheal tube    Airway Device Size:  7.5    Style/Cuff Inflation:  Cuffed (inflated to minimal occlusive pressure)    Inflation Amount (mL):  6    Tube secured:  21    Secured at:  The lips    Placement Verified By:  Capnometry    Complicating Factors:  None    Findings Post-Intubation:  BS equal bilateral and atraumatic/condition of teeth unchanged

## 2024-02-06 NOTE — ANESTHESIA POSTPROCEDURE EVALUATION
Anesthesia Post Evaluation    Patient: Jasmin Moreno    Procedure(s) Performed: Procedure(s) (LRB):  ORIF, ANKLE (Right)    Final Anesthesia Type: general      Patient location during evaluation: PACU  Patient participation: Yes- Able to Participate  Level of consciousness: awake and alert and oriented  Post-procedure vital signs: reviewed and stable  Pain management: adequate  Airway patency: patent  JHOANA mitigation strategies: Verification of full reversal of neuromuscular block  PONV status at discharge: No PONV  Anesthetic complications: no      Cardiovascular status: blood pressure returned to baseline and stable  Respiratory status: spontaneous ventilation and unassisted  Hydration status: euvolemic  Follow-up not needed.  Comments: [facility]              Vitals Value Taken Time   /73 02/06/24 1651   Temp 37.7 02/06/24 1659   Pulse 88 02/06/24 1658   Resp 12 02/06/24 1658   SpO2 100 % 02/06/24 1658   Vitals shown include unvalidated device data.      No case tracking events are documented in the log.      Pain/Rodrigo Score: No data recorded

## 2024-02-06 NOTE — ED PROVIDER NOTES
Encounter Date: 2/5/2024    SCRIBE #1 NOTE: I, Sven Tilley, am scribing for, and in the presence of,  Shahida Gonsalez DO. I have scribed the entire note.       History     Chief Complaint   Patient presents with    Recheck     Right lower leg re-check, fall in January. External devices present. Pt sent from [facility] for abnormal scans that need's Ortho evaluation. Pt denies pain.      44 year old female presents to the ED for follow up. Pt reports she slipped and fell in January. Pt had an ex fix done on her right LE on 1/26. Pt was seen at Dr. Almendarez's office today for a check up. Pt's edema to her right foot/ankle has not improved. Pt was told to come to the ED to be admitted for a revision of the ex fix. Pt denies any pain at this time, states it is well controlled with her pain medications at home.     The history is provided by the patient. No  was used.     Review of patient's allergies indicates:  No Known Allergies  Past Medical History:   Diagnosis Date    Fractures      Past Surgical History:   Procedure Laterality Date    APPLICATION, EXTERNAL FIXATION DEVICE Right 1/26/2024    Procedure: APPLICATION, EXTERNAL FIXATION DEVICE;  Surgeon: Tim Rashid MD;  Location: [facility];  Service: Orthopedics;  Laterality: Right;    none       Family History   Problem Relation Age of Onset    No Known Problems Mother     No Known Problems Father      Social History     Tobacco Use    Smoking status: Never    Smokeless tobacco: Never   Substance Use Topics    Alcohol use: Never    Drug use: Never     Review of Systems   Musculoskeletal:         Right foot/ankle swelling not improving. No pain       Physical Exam     Initial Vitals [02/05/24 1841]   BP Pulse Resp Temp SpO2   (!) 144/88 102 18 98.7 °F (37.1 °C) 97 %      MAP       --         Physical Exam    Nursing note and vitals reviewed.  Constitutional: She appears well-developed and well-nourished. She is not diaphoretic. She does not appear ill.  No distress.   HENT:   Head: Normocephalic and atraumatic.   Right Ear: External ear normal.   Left Ear: External ear normal.   Mouth/Throat: Oropharynx is clear and moist.   Eyes: Conjunctivae are normal.   Neck: Neck supple. No tracheal deviation present.   Cardiovascular:  Normal rate.           No murmur heard.  Palpable right DP pulse   Pulmonary/Chest: No respiratory distress.   Abdominal: Abdomen is soft. She exhibits no distension. There is no abdominal tenderness.   No right CVA tenderness.  No left CVA tenderness.   Musculoskeletal:         General: Normal range of motion.      Cervical back: Neck supple.      Comments: 2+ edema to the right ankle/foot. Ex fix in place.      Neurological: She is alert and oriented to person, place, and time. She has normal strength. GCS score is 15. GCS eye subscore is 4. GCS verbal subscore is 5. GCS motor subscore is 6.   Able to wiggle toes   Skin: Skin is warm and dry. Capillary refill takes less than 2 seconds. No rash noted. No pallor.   Brisk cap refill.          ED Course   Procedures  Labs Reviewed   COMPREHENSIVE METABOLIC PANEL - Abnormal; Notable for the following components:       Result Value    Chloride 111 (*)     Carbon Dioxide 19 (*)     All other components within normal limits   CBC WITH DIFFERENTIAL - Abnormal; Notable for the following components:    Hgb 10.0 (*)     Hct 31.4 (*)     MCV 71.2 (*)     MCH 22.7 (*)     MCHC 31.8 (*)     RDW 19.7 (*)     All other components within normal limits   VITAMIN D - Abnormal; Notable for the following components:    Vit D 25 OH 25.7 (*)     All other components within normal limits   APTT - Normal   PROTIME-INR - Normal   PREALBUMIN - Normal   CBC W/ AUTO DIFFERENTIAL    Narrative:     The following orders were created for panel order CBC Auto Differential.  Procedure                               Abnormality         Status                     ---------                               -----------         ------                      CBC with Differential[[id number]]       Abnormal            Final result                 Please view results for these tests on the individual orders.   URINALYSIS, REFLEX TO URINE CULTURE   PREGNANCY TEST, URINE RAPID   TYPE & SCREEN   ABORH RETYPE          Imaging Results              CT Leg (Tibia-Fibula) Without Contrast Right (Final result)  Result time 02/05/24 19:57:41   Procedure changed from CT Leg (Tibia-Fibula) Without Contrast Left     Final result by Booker Agustin MD (02/05/24 19:57:41)                   Narrative:    EXAMINATION  *CT LEG (TIBIA-FIBULA) WITHOUT CONTRAST RIGHT  *CT 3D WITHOUT INDEPENDENT WS    CLINICAL HISTORY  Fracture, tib/fib;; right pilon surgical planning;    TECHNIQUE  Non-contrast helical-acquisition CT images of the right leg were obtained.  Multiplanar and volume-rendered (3D) reconstructions accomplished by a CT technologist at a separate workstation, pushed to PACS for physician review.    COMPARISON  26 January 2024    FINDINGS  Images were reviewed in bone and soft tissue windows.    Exam quality: adequate for evaluation    External fixation hardware remains in place.  No significant change of the comminuted distal tibial diametaphysis fracture with intra-articular extension.  Distal fibular fracture is also similar.  Joint spacing and alignment are unchanged.  No new or worsened focal osseous irregularity is identified.  There is no periosteal reaction or destructive skeletal lesion.    No new or worsened soft tissue findings are appreciated.  Disorganized subcutaneous edema again surrounds the fracture site.  There is no expansile hematoma or drainable collection.    IMPRESSION  Similar findings of right leg external fixation with no significant interval change of the distal tibial and fibular fractures.    RADIATION DOSE  Automated tube current modulation, weight-based exposure dosing, and/or iterative reconstruction technique utilized to reach lowest  reasonably achievable exposure rate.    DLP: 999 mGy*cm      Electronically signed by: Booker Agustin  Date:    02/05/2024  Time:    19:57                                     CT 3D RECON WITHOUT INDEPENDENT WS (Final result)  Result time 02/05/24 19:57:41      Final result by Booker Agustin MD (02/05/24 19:57:41)                   Narrative:    EXAMINATION  *CT LEG (TIBIA-FIBULA) WITHOUT CONTRAST RIGHT  *CT 3D WITHOUT INDEPENDENT WS    CLINICAL HISTORY  Fracture, tib/fib;; right pilon surgical planning;    TECHNIQUE  Non-contrast helical-acquisition CT images of the right leg were obtained.  Multiplanar and volume-rendered (3D) reconstructions accomplished by a CT technologist at a separate workstation, pushed to PACS for physician review.    COMPARISON  26 January 2024    FINDINGS  Images were reviewed in bone and soft tissue windows.    Exam quality: adequate for evaluation    External fixation hardware remains in place.  No significant change of the comminuted distal tibial diametaphysis fracture with intra-articular extension.  Distal fibular fracture is also similar.  Joint spacing and alignment are unchanged.  No new or worsened focal osseous irregularity is identified.  There is no periosteal reaction or destructive skeletal lesion.    No new or worsened soft tissue findings are appreciated.  Disorganized subcutaneous edema again surrounds the fracture site.  There is no expansile hematoma or drainable collection.    IMPRESSION  Similar findings of right leg external fixation with no significant interval change of the distal tibial and fibular fractures.    RADIATION DOSE  Automated tube current modulation, weight-based exposure dosing, and/or iterative reconstruction technique utilized to reach lowest reasonably achievable exposure rate.    DLP: 999 mGy*cm      Electronically signed by: Booker Agustin  Date:    02/05/2024  Time:    19:57                                     Medications   oxyCODONE-acetaminophen  5-325 mg per tablet 1 tablet (has no administration in time range)   oxyCODONE-acetaminophen  mg per tablet 1 tablet (has no administration in time range)   morphine injection 4 mg (has no administration in time range)   melatonin tablet 6 mg (has no administration in time range)   methocarbamoL tablet 750 mg (750 mg Oral Given 2/5/24 2241)   ondansetron injection 4 mg (has no administration in time range)   gabapentin capsule 600 mg (600 mg Oral Given 2/5/24 2241)   sodium chloride 0.9% flush 10 mL (has no administration in time range)   0.9%  NaCl infusion ( Intravenous New Bag 2/6/24 0000)   famotidine tablet 20 mg (20 mg Oral Given 2/5/24 2241)     Medical Decision Making  43 yo female with RLE swelling, recent ex fix     Discussed with ortho- will admit and take to OR in AM     Problems Addressed:  Closed displaced pilon fracture of right tibia with delayed healing, subsequent encounter: acute illness or injury that poses a threat to life or bodily functions  Right leg swelling: acute illness or injury that poses a threat to life or bodily functions    Amount and/or Complexity of Data Reviewed  External Data Reviewed: notes.  Labs: ordered. Decision-making details documented in ED Course.  Radiology:  Decision-making details documented in ED Course.     Details: FINDINGS  Images were reviewed in bone and soft tissue windows.     Exam quality: adequate for evaluation     External fixation hardware remains in place.  No significant change of the comminuted distal tibial diametaphysis fracture with intra-articular extension.  Distal fibular fracture is also similar.  Joint spacing and alignment are unchanged.  No new or worsened focal osseous irregularity is identified.  There is no periosteal reaction or destructive skeletal lesion.     No new or worsened soft tissue findings are appreciated.  Disorganized subcutaneous edema again surrounds the fracture site.  There is no expansile hematoma or drainable  collection.     IMPRESSION  Similar findings of right leg external fixation with no significant interval change of the distal tibial and fibular fractures.      Risk  OTC drugs.  Prescription drug management.  Decision regarding hospitalization.  Emergency major surgery.            Scribe Attestation:   Scribe #1: I performed the above scribed service and the documentation accurately describes the services I performed. I attest to the accuracy of the note.    Attending Attestation:           Physician Attestation for Scribe:  Physician Attestation Statement for Scribe #1: I, Shahida Gonsalez, DO, reviewed documentation, as scribed by Sven Tilley in my presence, and it is both accurate and complete.             ED Course as of 02/05/24 2323   Mon Feb 05, 2024 2156 Reviewed ortho clinic visit from earlier today- still very swollen and somewhat short, ORIF vs ex fix revision recommended  [KM]      ED Course User Index  [KM] Shahida Gonsalez MD                           Clinical Impression:  Final diagnoses:  [S82.871G] Closed displaced pilon fracture of right tibia with delayed healing, subsequent encounter (Primary)  [M79.89] Right leg swelling          ED Disposition Condition    Admit Stable                Shahida Gonsalez MD  02/05/24 2323

## 2024-02-06 NOTE — DISCHARGE SUMMARY
[facility] - Periop Services  Short Stay  Discharge Summary    Admit Date: 1/26/2024    Discharge Date and Time: 1/26/2024  4:40 PM      Discharge Attending Physician: Tim Rashid    [facility] Course (synopsis of major diagnoses, care, treatment, and services provided during the course of the hospital stay): Patient's consents reviewed. Patient's operative extremity marked. Patient taken to OR where they underwent ex fix R tib fib. Patient tolerated the procedure well. Patient discharged with clinic follow-up after clearing PACU criteria.       Final Diagnoses:    Principal Problem:right pilon  Discharged Condition: stable    Disposition: Home or Self Care    Follow up/Patient Instructions:    Medications:  Reconciled Home Medications:      Medication List        START taking these medications      aspirin 81 MG EC tablet  Commonly known as: ECOTRIN  Take 1 tablet (81 mg total) by mouth 2 (two) times a day.     gabapentin 600 MG tablet  Commonly known as: NEURONTIN  Take 1 tablet (600 mg total) by mouth 3 (three) times daily.     HYDROcodone-acetaminophen  mg per tablet  Commonly known as: NORCO  Take 1 tablet by mouth every 6 (six) hours as needed for Pain.  Replaces: HYDROcodone-acetaminophen 5-325 mg per tablet            CHANGE how you take these medications      ibuprofen 800 MG tablet  Commonly known as: ADVIL,MOTRIN  Take 1 tablet (800 mg total) by mouth 3 (three) times daily.  What changed:   medication strength  how much to take  when to take this            STOP taking these medications      HYDROcodone-acetaminophen 5-325 mg per tablet  Commonly known as: NORCO  Replaced by: HYDROcodone-acetaminophen  mg per tablet            ASK your doctor about these medications      methocarbamoL 750 MG Tab  Commonly known as: ROBAXIN  Take 1 tablet (750 mg total) by mouth 4 (four) times daily. for 10 days  Ask about: Should I take this medication?            Discharge Procedure Orders   Leave  dressing on - Keep it clean, dry, and intact until clinic visit     Keep surgical extremity elevated     Weight bearing restrictions (specify):   Order Comments: ISA KING

## 2024-02-06 NOTE — H&P
[facility] - Emergency Dept  Orthopedic Trauma  History and Physical    Patient Name: Jasmin Moreno  MRN: [id number]  Admission Date: 2/5/2024  Hospital Length of Stay: 1 days  Attending Provider: Ata Prado DO  Primary Care Provider: Nancy Primary Doctor        Chief Complaint:   Chief Complaint   Patient presents with    Recheck     Right lower leg re-check, fall in January. External devices present. Pt sent from [facility] for abnormal scans that need's Ortho evaluation. Pt denies pain.         HPI:  Patient is a 44-year-old female who presented to outside facility January 2024.  They are diagnosed with an ankle fracture and placed in a splint and followed up at the [facility] clinic which ex fix the patient on Friday.  We have reached out to me for orthopedic evaluation and fixation.  She is dull achy pain in the right ankle no radiation of pain.  No numbness or tingling.  She is having increased swelling.    Past Medical History:   Diagnosis Date    Fractures        Past Surgical History:   Procedure Laterality Date    APPLICATION, EXTERNAL FIXATION DEVICE Right 1/26/2024    Procedure: APPLICATION, EXTERNAL FIXATION DEVICE;  Surgeon: Tim Rashid MD;  Location: [facility];  Service: Orthopedics;  Laterality: Right;    none         Review of patient's allergies indicates:  No Known Allergies    Current Facility-Administered Medications   Medication    0.9%  NaCl infusion    cefazolin (ANCEF) 2 gram in dextrose 5% 50 mL IVPB (premix)    enoxaparin injection 40 mg    famotidine tablet 20 mg    gabapentin capsule 600 mg    melatonin tablet 6 mg    methocarbamoL tablet 750 mg    morphine injection 4 mg    ondansetron injection 4 mg    oxyCODONE immediate release tablet 5 mg    oxyCODONE immediate release tablet Tab 10 mg    sodium chloride 0.9% flush 10 mL     Current Outpatient Medications   Medication Sig    aspirin (ECOTRIN) 81 MG EC tablet Take 1 tablet (81 mg total) by mouth 2 (two) times a day.     "gabapentin (NEURONTIN) 600 MG tablet Take 1 tablet (600 mg total) by mouth 3 (three) times daily.    HYDROcodone-acetaminophen (NORCO)  mg per tablet Take 1 tablet by mouth every 6 (six) hours as needed for Pain.    ibuprofen (ADVIL,MOTRIN) 800 MG tablet Take 1 tablet (800 mg total) by mouth 3 (three) times daily.     Facility-Administered Medications Ordered in Other Encounters   Medication    LIDOcaine (PF) 10 mg/ml (1%) injection 10 mg    metoclopramide injection 10 mg     Family History       Problem Relation (Age of Onset)    No Known Problems Mother, Father          Tobacco Use    Smoking status: Never    Smokeless tobacco: Never   Substance and Sexual Activity    Alcohol use: Never    Drug use: Never    Sexual activity: Not Currently     Partners: Male     Birth control/protection: None       ROS:  Constitutional: Denies fever chills  Eyes: No change in vision  ENT: No ringing or current infections  CV: No chest pain  Resp: No labored breathing  MSK: Pain evident at site of injury located in HPI,   Integ: No signs of abrasions or lacerations  Neuro: No numbness or tingling  Lymphatic: No swelling outside the area of injury   Objective:     Vital Signs (Most Recent):  Temp: 98.7 °F (37.1 °C) (02/05/24 1841)  Pulse: 89 (02/06/24 0602)  Resp: (!) 22 (02/06/24 0602)  BP: 111/72 (02/06/24 0602)  SpO2: 99 % (02/06/24 0602) Vital Signs (24h Range):  Temp:  [98.7 °F (37.1 °C)-100 °F (37.8 °C)] 98.7 °F (37.1 °C)  Pulse:  [] 89  Resp:  [14-22] 22  SpO2:  [96 %-100 %] 99 %  BP: (105-144)/(60-88) 111/72     Weight: 59.9 kg (132 lb)  Height: 5' 7" (170.2 cm)  Body mass index is 20.67 kg/m².      Intake/Output Summary (Last 24 hours) at 2/6/2024 0712  Last data filed at 2/5/2024 2339  Gross per 24 hour   Intake --   Output 800 ml   Net -800 ml       Ortho/SPM Exam  General the patient is alert and oriented x3 no acute distress nontoxic-appearing appropriate affect.    Constitutional: Vital signs are examined " and stable.  Resp: No signs of labored breathing                 RLE: -Skin: No signs of new abrasions or lacerations, no scars, mild swelling no blistering           -MSK: : Hip and Knee F/E, EHL/FHL, Strength 5/5           -Neuro:  Sensation intact to light touch L3-S1 dermatomes           -Lymphatic: No signs of lymphadenopathy           -CV: Capillary refill is less than 2 seconds. DP/PT pulses  2/4. Compartments soft and compressible.     Significant Labs: Results reviewed  Recent Lab Results         02/05/24  2339   02/05/24  2229   02/05/24  1913        Albumin/Globulin Ratio     1.1       ABO and RH   O POS         Albumin     3.5       ALP     68       ALT     42       Appearance, UA Clear           PTT     27.1  Comment: For Minimal Heparin Infusion, the goal aPTT 64-85 seconds corresponds to an anti-Xa of 0.3-0.5.    For Low Intensity and High Intensity Heparin, the goal aPTT  seconds corresponds to an anti-Xa of 0.3-0.7       AST     29       Bacteria, UA None Seen           Baso #     0.03       Basophil %     0.5       BILIRUBIN TOTAL     0.3       Bilirubin, UA Negative           BUN     11.7       Calcium     8.8       Chloride     111       CO2     19       Color, UA Colorless           Creatinine     0.75       eGFR     >60       Eos #     0.29       Eos %     4.7       Globulin, Total     3.1       Glucose     97       Glucose, UA Normal           Group & Rh     O POS       Hematocrit     31.4       Hemoglobin     10.0       Immature Grans (Abs)     0.02       Immature Granulocytes     0.3       Indirect Rahat GEL     NEG  Comment: @02/05/24 22:43 by SEB:  Performed in tube       INR     1.0       Ketones, UA Negative           Leukocyte Esterase, UA 25           Lymph #     1.56       LYMPH %     25.4       MCH     22.7       MCHC     31.8       MCV     71.2       Mono #     0.58       Mono %     9.4       MPV     9.6       Mucous, UA Trace           Neut #     3.66       Neut %     59.7        NITRITE UA Negative           nRBC     0.0       Blood, UA Negative           pH, UA 5.5           Platelet Count     390       Potassium     4.2       Prealbumin     26.2       hCG Qualitative, Urine Negative           PROTEIN TOTAL     6.6       Protein, UA Negative           PT     12.8       RBC     4.41       RBC, UA 0-5           RDW     19.7       Sodium     140       Specific Gravity,UA 1.010           Specimen Outdate     02/08/2024 23:59       Squamous Epithelial Cells, UA Trace           Urobilinogen, UA Normal           Vitamin D     25.7       WBC, UA 0-5           WBC     6.14             All pertinent labs within the past 24 hours have been reviewed.  Recent Lab Results         02/05/24  2339   02/05/24  2229   02/05/24  1913        Albumin/Globulin Ratio     1.1       ABO and RH   O POS         Albumin     3.5       ALP     68       ALT     42       Appearance, UA Clear           PTT     27.1  Comment: For Minimal Heparin Infusion, the goal aPTT 64-85 seconds corresponds to an anti-Xa of 0.3-0.5.    For Low Intensity and High Intensity Heparin, the goal aPTT  seconds corresponds to an anti-Xa of 0.3-0.7       AST     29       Bacteria, UA None Seen           Baso #     0.03       Basophil %     0.5       BILIRUBIN TOTAL     0.3       Bilirubin, UA Negative           BUN     11.7       Calcium     8.8       Chloride     111       CO2     19       Color, UA Colorless           Creatinine     0.75       eGFR     >60       Eos #     0.29       Eos %     4.7       Globulin, Total     3.1       Glucose     97       Glucose, UA Normal           Group & Rh     O POS       Hematocrit     31.4       Hemoglobin     10.0       Immature Grans (Abs)     0.02       Immature Granulocytes     0.3       Indirect Rahat GEL     NEG  Comment: @02/05/24 22:43 by SEB:  Performed in tube       INR     1.0       Ketones, UA Negative           Leukocyte Esterase, UA 25           Lymph #     1.56       LYMPH %      25.4       MCH     22.7       MCHC     31.8       MCV     71.2       Mono #     0.58       Mono %     9.4       MPV     9.6       Mucous, UA Trace           Neut #     3.66       Neut %     59.7       NITRITE UA Negative           nRBC     0.0       Blood, UA Negative           pH, UA 5.5           Platelet Count     390       Potassium     4.2       Prealbumin     26.2       hCG Qualitative, Urine Negative           PROTEIN TOTAL     6.6       Protein, UA Negative           PT     12.8       RBC     4.41       RBC, UA 0-5           RDW     19.7       Sodium     140       Specific Gravity,UA 1.010           Specimen Outdate     02/08/2024 23:59       Squamous Epithelial Cells, UA Trace           Urobilinogen, UA Normal           Vitamin D     25.7       WBC, UA 0-5           WBC     6.14                Significant Imaging: I have reviewed all pertinent imaging results/findings.  CT 3D RECON WITHOUT INDEPENDENT WS    Result Date: 2/5/2024  EXAMINATION *CT LEG (TIBIA-FIBULA) WITHOUT CONTRAST RIGHT *CT 3D WITHOUT INDEPENDENT WS CLINICAL HISTORY Fracture, tib/fib;; right pilon surgical planning; TECHNIQUE Non-contrast helical-acquisition CT images of the right leg were obtained.  Multiplanar and volume-rendered (3D) reconstructions accomplished by a CT technologist at a separate workstation, pushed to PACS for physician review. COMPARISON 26 January 2024 FINDINGS Images were reviewed in bone and soft tissue windows. Exam quality: adequate for evaluation External fixation hardware remains in place.  No significant change of the comminuted distal tibial diametaphysis fracture with intra-articular extension.  Distal fibular fracture is also similar.  Joint spacing and alignment are unchanged.  No new or worsened focal osseous irregularity is identified.  There is no periosteal reaction or destructive skeletal lesion. No new or worsened soft tissue findings are appreciated.  Disorganized subcutaneous edema again surrounds  the fracture site.  There is no expansile hematoma or drainable collection. IMPRESSION Similar findings of right leg external fixation with no significant interval change of the distal tibial and fibular fractures. RADIATION DOSE Automated tube current modulation, weight-based exposure dosing, and/or iterative reconstruction technique utilized to reach lowest reasonably achievable exposure rate. DLP: 999 mGy*cm Electronically signed by: Booker Agustin Date:    02/05/2024 Time:    19:57    CT Leg (Tibia-Fibula) Without Contrast Right    Result Date: 2/5/2024  EXAMINATION *CT LEG (TIBIA-FIBULA) WITHOUT CONTRAST RIGHT *CT 3D WITHOUT INDEPENDENT WS CLINICAL HISTORY Fracture, tib/fib;; right pilon surgical planning; TECHNIQUE Non-contrast helical-acquisition CT images of the right leg were obtained.  Multiplanar and volume-rendered (3D) reconstructions accomplished by a CT technologist at a separate workstation, pushed to PACS for physician review. COMPARISON 26 January 2024 FINDINGS Images were reviewed in bone and soft tissue windows. Exam quality: adequate for evaluation External fixation hardware remains in place.  No significant change of the comminuted distal tibial diametaphysis fracture with intra-articular extension.  Distal fibular fracture is also similar.  Joint spacing and alignment are unchanged.  No new or worsened focal osseous irregularity is identified.  There is no periosteal reaction or destructive skeletal lesion. No new or worsened soft tissue findings are appreciated.  Disorganized subcutaneous edema again surrounds the fracture site.  There is no expansile hematoma or drainable collection. IMPRESSION Similar findings of right leg external fixation with no significant interval change of the distal tibial and fibular fractures. RADIATION DOSE Automated tube current modulation, weight-based exposure dosing, and/or iterative reconstruction technique utilized to reach lowest reasonably achievable  exposure rate. DLP: 999 mGy*cm Electronically signed by: Booker Agustin Date:    02/05/2024 Time:    19:57    X-Ray Tibia Fibula 2 View Right    Result Date: 2/5/2024  EXAMINATION: XR TIBIA FIBULA 2 VIEW RIGHT CLINICAL HISTORY: Pain in right ankle and joints of right foot TECHNIQUE: Two views of the right tibia and fibula COMPARISON: 01/26/2024 FINDINGS: Postsurgical changes of external fixation.  Fracture fragments are in near anatomic alignment minimally changed in the interval.     Postsurgical changes of external fixation. Electronically signed by: Kings Kolb Date:    02/05/2024 Time:    09:21    X-Ray Ankle Complete Right    Result Date: 2/5/2024  EXAMINATION: XR ANKLE COMPLETE 3 VIEW RIGHT CLINICAL HISTORY: Pain in right ankle and joints of right foot COMPARISON: None. FINDINGS: There is an external fixating device Comminuted displaced fracture involving the distal tibia with fracture of the distal fibula with no significant change in position or alignment as compared with the previous exam Joint spaces preserved. No blastic or lytic lesions. Soft tissues within normal limits.     External fixation of fractures as above. Electronically signed by: Andrea Cornell Date:    02/05/2024 Time:    09:19    CT Leg (Tibia-Fibula) Without Contrast Right    Result Date: 1/26/2024  CLINICAL HISTORY: Trauma. TECHNIQUE: Right lower extremity CT was performed without  contrast. There are sagittal and coronal reconstructed images available for review. Automatic exposure control was utilized to reduce the patient's radiation dose. DLP = 462 COMPARISON: No prior imaging available for comparison. FINDINGS: Postsurgical changes of external fixation device.  The distal tibia and fibular fracture fragments are in near anatomic alignment.  Soft tissue edema noted throughout.  The proximal tibia and fibula appear intact.  Soft tissue edema noted about the ankle and foot.     Postsurgical changes of external fixation.  Electronically signed by: Kings Kolb Date:    01/26/2024 Time:    15:22    SURG FL Surgery Fluoro Usage    Result Date: 1/26/2024  See OP Notes for results. IMPRESSION: See OP Notes for results. This procedure was auto-finalized by: Virtual Radiologist    X-Ray Tibia Fibula 2 View Right    Result Date: 1/26/2024  EXAMINATION: XR TIBIA FIBULA 2 VIEW RIGHT CLINICAL HISTORY: Displaced bimalleolar fracture of right lower leg, initial encounter for closed fracture TECHNIQUE: AP and lateral views of the right tibia and fibula were performed. COMPARISON: None. FINDINGS: There are there is a distal tibia fracture.  Fracture is displaced and comminuted with overlapping of fragments.  Fracture does extend intra-articular     Study showing comminuted distal tibia fracture. Electronically signed by: Tommy Russ MD Date:    01/26/2024 Time:    10:10    X-Ray Ankle Complete Right    Result Date: 1/16/2024  EXAMINATION: XR ANKLE COMPLETE 3 VIEW RIGHT CLINICAL HISTORY: Edema, unspecified COMPARISON: None. FINDINGS: Comminuted displaced fracture dislocation involving the distal tibia and lateral malleolus with displacement of fracture fragments and significant soft tissue swelling Joint spaces preserved. No blastic or lytic lesions. Soft tissues within normal limits.     Fracture dislocation as above. Electronically signed by: Andrea Cornell Date:    01/16/2024 Time:    08:34       Assessment/Plan:     Active Diagnoses:    Diagnosis Date Noted POA    PRINCIPAL PROBLEM:  Closed displaced pilon fracture of right tibia [S82.871A] 02/06/2024 Yes      Problems Resolved During this Admission:       Patient has a right distal tibia pilon fracture status post ex fix on Friday.  Patient's original injury was the middle of January of 2024.  Patient is exiting surgical window.  Patient also has shortening and displacement of fracture fragment.  Due to the instability about the fracture of the severity of the intra-articular  comminution recommend patient undergo surgical fixation today.  We had discussions about the risks and benefits of the surgery.  We talked in detail about the severity of this injury and the risks associated with this intra-articular ankle injury.  We have also discussed the risks of her soft tissue and the risks stated below.  We will proceed with open reduction internal fixation today.    We specifically discussed possible risk of amputation setting of deep infection or failed soft tissue coverage or chronic pain.    I explained that surgery and the nature of their condition are not without risks. These include, but are not limited to, bleeding, infection, neurovascular compromise, malunion, nonunion, hardware complications, wound complications, scarring, cosmetic defects, need for later and/or repeated surgeries, avascular necrosis, bone death due to initial trauma, pain, loss of ROM, loss of function, PTOA, deformity, stance/gait and/or functional abnormalities, thromboembolic complications, compartment syndrome, loss of limb, loss of life, anesthetic complications, and other imponderables. I explained that these can occur despite the adequacy of treatments rendered, and that their risks are heightened given the nature of their condition. They verbalized understanding. They would like to continue with surgery at this time. If appropriate family was involved with surgical discussion.         The patient has been prescribed an opioid analgesic that exceeds the limits set forth in the opioid prescribing rules for acute pain. Treatment with non-opioid medications is not a suitable alternative given the patient's condition. The patient meets exception criteria for: exceeding the 5 or 7 day rule when prescribing narcotic medications because: he/she suffers from a injury that requires more than 5 or 7 day duration because the patient is continuing to recover. This is the minimum duration consistent with the patient's  medical condition.  The prescribed dose and/or duration is the level necessary to therapeutically treat the patient's pain. The patient was advised of the risks and benefits of opioid medications, including the potential for addiction.    This note/OR report was created with the assistance of  voice recognition software or phone  dictation.  There may be transcription errors as a result of using this technology however minimal. Effort has been made to assure accuracy of transcription but any obvious errors or omissions should be clarified with the author of the document.       Ata Prado,    Orthopedic Trauma Surgery  [facility] - Emergency Dept

## 2024-02-06 NOTE — FIRST PROVIDER EVALUATION
"Medical screening examination initiated.  I have conducted a focused provider triage encounter, findings are as follows:    Brief history of present illness:  44 year old female sent to ER by Dr. Almendarez. Patient went for follow up for right ankle ex fix today. She was still having significant swelling so she was sent her for admission for ORIF v. Ex fix revision     Vitals:    02/05/24 1841   BP: (!) 144/88   Pulse: 102   Resp: 18   Temp: 98.7 °F (37.1 °C)   TempSrc: Oral   SpO2: 97%   Weight: 59.9 kg (132 lb)   Height: 5' 7" (1.702 m)       Pertinent physical exam:  awake and alert, NAD    Brief workup plan:  Labs     Preliminary workup initiated; this workup will be continued and followed by the physician or advanced practice provider that is assigned to the patient when roomed.  "

## 2024-02-06 NOTE — OP NOTE
OPERATIVE REPORT      Patient: Jasmin Moreno   : 1980    MRN: [id number]  Date: 2024      Surgeon:Ata Prado DO  Assistant: Manuelito Trotter was essential, part of the procedure including deep hardware placement and deep closure.  No senior assistant was availible  Preoperative Diagnosis:  Right tibia pilon and fibula fracture with comminution s/p ex fix placement  Postoperative Diagnosis: Same  Procedure:    Open reduction internal fixation right tibia P line fracture and fibula fracture-CPT 35326  Ex fix removal right ankle-CPT 73183  Anesthesiologist: Cb Loya MD  OR Staff: Circulator: Jane Marinelli RN  Physician Assistant: Desirae Trotter PA-C  Radiology Technologist: Jaquelin Chatterjee RT  Scrub Person: Evi Hubbard ST  Implants:   Implant Name Type Inv. Item Serial No.  Lot No. LRB No. Used Action   CANNULATED COMPRESSION HEADLESS SCREW    [employer]  Right 1 Implanted   CANNULATED COMPRESSION HEADLESS SCREW    [employer]  Right 1 Implanted   CANNULATED COMPRESSION HEADLESS SCREW    [employer]  Right 1 Implanted   GUIDEWIRE TROCAR TIP [id number] - [id number]  GUIDEWIRE TROCAR TIP 1.2S834IU  [employer]  Right 4 Implanted and Explanted   PIN TRANFX EXFIX [id number] - [id number]  PIN TRANFX EXFIX 6X225  [employer]  Right 1 Implanted   SCREW CORTEX 3.5 X 22 - [id number]  SCREW CORTEX 3.5 X 22  [employer]  Right 2 Implanted   SCREW CORTEX 3.5 X 24 - [id number]  SCREW CORTEX 3.5 X 24  [employer]  Right 1 Implanted   SCREW CAN 4.9HJV40XM - [id number]  SCREW CAN 4.7LCQ26DK  [employer]  Right 1 Implanted   SCREW BONE CANCELLOUS 4.0MM/ - [id number]  SCREW BONE CANCELLOUS 4.0MM/  [employer]  Right 1 Implanted   SCREW CANCL 4.0MM 14MM - [id number]  SCREW CANCL 4.0MM 14MM  [employer]  Right 1 Implanted   PLATE W COLLAR - [id number]  PLATE W COLLAR  [employer]  Right 1 Implanted   SCREW STRDRV REC T8 2.4X12 SS - [id number]  SCREW STRDRV REC T8 2.4X12 SS  [employer]  Right 1 Implanted   SCREW STRDRV REC T8  2.4X14 SS - [id number]  SCREW STRDRV REC T8 2.4X14 SS  [employer]  Right 1 Implanted   SCREW STRDRV REC T8 2.4X18 SS - [id number]  SCREW STRDRV REC T8 2.4X18 SS  [employer]  Right 1 Implanted   PLATE LCP 7 HOLE - [id number]  PLATE LCP 7 HOLE  [employer]  Right 1 Implanted   VARIABLE ANGLE LCP MEDIAL DISTAL TIBIA PLATE    [employer]  Right 1 Implanted   SCREW 2.7MM X 16 LOCKING ANGLE - [id number]  SCREW 2.7MM X 16 LOCKING ANGLE  [employer]  Right 2 Implanted   SCREW BONE LOCK VA 2.7X26MM - [id number]  SCREW BONE LOCK VA 2.7X26MM  [employer]  Right 1 Implanted   SCREW BONE VA LK 2.7X28MM - [id number]  SCREW BONE VA LK 2.7X28MM  [employer]  Right 1 Implanted   SCREW BONE VA LK 2.7X34MM - [id number]  SCREW BONE VA LK 2.7X34MM  [employer]  Right 1 Implanted   SCREW BONE VA LK 2.7X34MM - [id number]  SCREW BONE VA LK 2.7X34MM  [employer]  Right 2 Implanted   CANNULATED COMPRESSION HEADLESS SCREW    [employer]  Right 1 Implanted   SCREW CANCL 4.0MM 12MM - [id number]  SCREW CANCL 4.0MM 12MM  [employer]  Right 1 Implanted   SCREW CANCELLOUS FT 4MM X 20MM - [id number]  SCREW CANCELLOUS FT 4MM X 20MM  [employer]  Right 1 Implanted   SCREW LOCKING 3.5 X 10 - [id number]  SCREW LOCKING 3.5 X 10  [employer]  Right 1 Implanted   SCREW LOCKING 3.5MM X 12MM - [id number]  SCREW LOCKING 3.5MM X 12MM  [employer]  Right 1 Implanted   SCREW LOCKING 3.5MM X 14MM. - [id number]  SCREW LOCKING 3.5MM X 14MM.  [employer]  Right 1 Implanted     EBL: 100cc  Complications: None  Disposition: To PACU, stable    Indications: Jasmin Moreno is a 44 y.o. female presenting with the aforementioned injuries/findings. The procedure is indicated to provide stability to the right ankle and realign her joint.  The patient is awake and alert. After thorough discussion of the risks, benefits, expected outcomes, and alternatives to surgical intervention, the patient agreed to proceed with surgical treatment.  Specific risks discussed included, but were not limited to: superficial or deep infection,  wound healing complications, DVT/PE, significant bleeding requiring transfusion, damage to named anatomic structures in the immediate area including named neurovascular structures, infection, nonunion, malunion and general risks of anesthesia.  The patient voiced understanding and written as well as verbal consent was obtained by myself prior to the procedure.    Patient had a subacute presentation for her right intra-articular distal tibia fracture and her comminuted ankle fracture.  This injury is severe severe injury.  She is severely malreduced with shortening and likely beginning healing her fracture nearing 5 weeks out.  These injuries are severe injuries they do and can lead to permanent dysfunction functional deficits chronic pain and even amputation.    Procedure Note:  The patient was brought back to the OR and placed supine on the OR table. After successful induction of anesthesia by anesthesia staff, the patient was positioned in the supine position and all bony prominences were padded appropriately.  The surgical field was then provisionally cleansed and then prepped and draped in the usual sterile fashion.    At this time a time-out was performed, with the correct patient, site, and procedure identified.  The universal time out as well as sign your site protocols were followed.  Preoperative antibiotics were verified as administered.     Attention is drawn to the right ankle I removed the external fixation device without complication    Attention is drawn to the fluoroscopic imaging which we then manipulated the ankle has no motion at the fracture site severely short and subluxed posteriorly.  I made a standard medial incision over the medial aspect of the tibia to expose the fracture line evident on CT.  I then freed up fracture ends and created motion across the fracture site.  We then removed a proximal piece of bone that was devoid of soft tissue.  I then made incision laterally and freed up the  distal fibula fracture which was 100% displaced posteriorly.  This was then clamped into place I then turned my attention back to the intra-articular fracture of the distal tibia.  We cleared out small articular pieces devoid of bone.  I then used multiple clamps to reduce this under tension.  I then placed multiple headless compression screws across the joint.  Satisfied length alignment rotation I then began to place the variable angle medial plate and placed screws proximally and distally.  Satisfied length alignment rotation and fluoroscopic imaging we then proceeded to the lateral side patient's fibula was soft with poor bone quality.  We freed up the distal fibula clamped back into place.  Placed a 1/3 tubular plate and a subsequent 2.4 plate.  Satisfied length alignment rotation I then placed a headless compression screw through the anterior lateral fragment of the distal tibia through the lateral incision.  We then stressed the syndesmosis which showed to be stable.      The incision(s) was/were then irrigated using copious sterile saline and then vancomyocin was added to the wound bed for prophylaxis. The surgical wound was closed in layered fashion.  The surgical site(s) was/were were sterilely cleansed and dressed.    The patient was then subsequently transferred to to PACU in a stable condition.    All sponge and needle counts were correct at the end of the case.  I was present and participated in all aspects of the procedure.    Prognosis:  The patient will be kept NWB 10-12 weeks .  Patient will receive DVT prophylaxis . Plan for discharge tomorrow.      This note/OR report was created with the assistance of  voice recognition software or phone  dictation.  There may be transcription errors as a result of using this technology however minimal. Effort has been made to assure accuracy of transcription but any obvious errors or omissions should be clarified with the author of the document.       Ata  Johan DO  Orthopedic Trauma Surgery

## 2024-02-07 VITALS
RESPIRATION RATE: 16 BRPM | HEART RATE: 102 BPM | TEMPERATURE: 99 F | WEIGHT: 132 LBS | DIASTOLIC BLOOD PRESSURE: 83 MMHG | SYSTOLIC BLOOD PRESSURE: 127 MMHG | OXYGEN SATURATION: 98 % | BODY MASS INDEX: 20.72 KG/M2 | HEIGHT: 67 IN

## 2024-02-07 LAB
ALBUMIN SERPL-MCNC: 3.1 G/DL (ref 3.5–5)
ALBUMIN/GLOB SERPL: 1.3 RATIO (ref 1.1–2)
ALP SERPL-CCNC: 59 UNIT/L (ref 40–150)
ALT SERPL-CCNC: 32 UNIT/L (ref 0–55)
ANISOCYTOSIS BLD QL SMEAR: ABNORMAL
AST SERPL-CCNC: 26 UNIT/L (ref 5–34)
BASOPHILS # BLD AUTO: 0.03 X10(3)/MCL
BASOPHILS NFR BLD AUTO: 0.4 %
BILIRUB SERPL-MCNC: 0.6 MG/DL
BUN SERPL-MCNC: 9.8 MG/DL (ref 7–18.7)
CALCIUM SERPL-MCNC: 8.4 MG/DL (ref 8.4–10.2)
CHLORIDE SERPL-SCNC: 110 MMOL/L (ref 98–107)
CO2 SERPL-SCNC: 24 MMOL/L (ref 22–29)
CREAT SERPL-MCNC: 0.75 MG/DL (ref 0.55–1.02)
EOSINOPHIL # BLD AUTO: 0.07 X10(3)/MCL (ref 0–0.9)
EOSINOPHIL NFR BLD AUTO: 0.8 %
ERYTHROCYTE [DISTWIDTH] IN BLOOD BY AUTOMATED COUNT: 19.4 % (ref 11.5–17)
GFR SERPLBLD CREATININE-BSD FMLA CKD-EPI: >60 MLS/MIN/1.73/M2
GLOBULIN SER-MCNC: 2.4 GM/DL (ref 2.4–3.5)
GLUCOSE SERPL-MCNC: 99 MG/DL (ref 74–100)
HCT VFR BLD AUTO: 23.9 % (ref 37–47)
HCT VFR BLD AUTO: 24.3 % (ref 37–47)
HGB BLD-MCNC: 7.8 G/DL (ref 12–16)
HGB BLD-MCNC: 7.9 G/DL (ref 12–16)
IMM GRANULOCYTES # BLD AUTO: 0.03 X10(3)/MCL (ref 0–0.04)
IMM GRANULOCYTES NFR BLD AUTO: 0.4 %
LYMPHOCYTES # BLD AUTO: 1.3 X10(3)/MCL (ref 0.6–4.6)
LYMPHOCYTES NFR BLD AUTO: 15.4 %
MCH RBC QN AUTO: 22.6 PG (ref 27–31)
MCHC RBC AUTO-ENTMCNC: 32.6 G/DL (ref 33–36)
MCV RBC AUTO: 69.3 FL (ref 80–94)
MICROCYTES BLD QL SMEAR: ABNORMAL
MONOCYTES # BLD AUTO: 0.95 X10(3)/MCL (ref 0.1–1.3)
MONOCYTES NFR BLD AUTO: 11.2 %
NEUTROPHILS # BLD AUTO: 6.08 X10(3)/MCL (ref 2.1–9.2)
NEUTROPHILS NFR BLD AUTO: 71.8 %
NRBC BLD AUTO-RTO: 0 %
PLATELET # BLD AUTO: 291 X10(3)/MCL (ref 130–400)
PLATELET # BLD EST: NORMAL 10*3/UL
PMV BLD AUTO: 9 FL (ref 7.4–10.4)
POIKILOCYTOSIS BLD QL SMEAR: ABNORMAL
POTASSIUM SERPL-SCNC: 3.8 MMOL/L (ref 3.5–5.1)
PROT SERPL-MCNC: 5.5 GM/DL (ref 6.4–8.3)
RBC # BLD AUTO: 3.45 X10(6)/MCL (ref 4.2–5.4)
RBC MORPH BLD: ABNORMAL
SODIUM SERPL-SCNC: 140 MMOL/L (ref 136–145)
TARGETS BLD QL SMEAR: ABNORMAL
WBC # SPEC AUTO: 8.46 X10(3)/MCL (ref 4.5–11.5)

## 2024-02-07 PROCEDURE — 96361 HYDRATE IV INFUSION ADD-ON: CPT

## 2024-02-07 PROCEDURE — 96366 THER/PROPH/DIAG IV INF ADDON: CPT

## 2024-02-07 PROCEDURE — 63600175 PHARM REV CODE 636 W HCPCS: Performed by: PHYSICIAN ASSISTANT

## 2024-02-07 PROCEDURE — 97161 PT EVAL LOW COMPLEX 20 MIN: CPT

## 2024-02-07 PROCEDURE — 85018 HEMOGLOBIN: CPT | Performed by: PHYSICIAN ASSISTANT

## 2024-02-07 PROCEDURE — 97165 OT EVAL LOW COMPLEX 30 MIN: CPT

## 2024-02-07 PROCEDURE — 96376 TX/PRO/DX INJ SAME DRUG ADON: CPT

## 2024-02-07 PROCEDURE — 85025 COMPLETE CBC W/AUTO DIFF WBC: CPT | Performed by: PHYSICIAN ASSISTANT

## 2024-02-07 PROCEDURE — 80053 COMPREHEN METABOLIC PANEL: CPT | Performed by: PHYSICIAN ASSISTANT

## 2024-02-07 PROCEDURE — 25000003 PHARM REV CODE 250: Performed by: PHYSICIAN ASSISTANT

## 2024-02-07 PROCEDURE — G0378 HOSPITAL OBSERVATION PER HR: HCPCS

## 2024-02-07 PROCEDURE — 25000003 PHARM REV CODE 250: Performed by: EMERGENCY MEDICINE

## 2024-02-07 RX ORDER — METHOCARBAMOL 750 MG/1
750 TABLET, FILM COATED ORAL 3 TIMES DAILY PRN
Qty: 30 TABLET | Refills: 0 | Status: SHIPPED | OUTPATIENT
Start: 2024-02-07 | End: 2024-02-29 | Stop reason: SDUPTHER

## 2024-02-07 RX ORDER — ASPIRIN 81 MG/1
81 TABLET ORAL 2 TIMES DAILY
Qty: 84 TABLET | Refills: 0 | Status: SHIPPED | OUTPATIENT
Start: 2024-02-07 | End: 2024-02-07

## 2024-02-07 RX ORDER — OXYCODONE AND ACETAMINOPHEN 10; 325 MG/1; MG/1
1 TABLET ORAL EVERY 4 HOURS PRN
Qty: 42 TABLET | Refills: 0 | Status: SHIPPED | OUTPATIENT
Start: 2024-02-07 | End: 2024-02-29 | Stop reason: SDUPTHER

## 2024-02-07 RX ORDER — ASPIRIN 81 MG/1
81 TABLET ORAL 2 TIMES DAILY
Qty: 84 TABLET | Refills: 0 | Status: SHIPPED | OUTPATIENT
Start: 2024-02-07 | End: 2024-03-20

## 2024-02-07 RX ADMIN — GABAPENTIN 600 MG: 300 CAPSULE ORAL at 10:02

## 2024-02-07 RX ADMIN — KETOROLAC TROMETHAMINE 15 MG: 30 INJECTION, SOLUTION INTRAMUSCULAR; INTRAVENOUS at 12:02

## 2024-02-07 RX ADMIN — CEFAZOLIN SODIUM 2 G: 2 SOLUTION INTRAVENOUS at 05:02

## 2024-02-07 RX ADMIN — METHOCARBAMOL 750 MG: 750 TABLET ORAL at 10:02

## 2024-02-07 RX ADMIN — KETOROLAC TROMETHAMINE 15 MG: 30 INJECTION, SOLUTION INTRAMUSCULAR; INTRAVENOUS at 11:02

## 2024-02-07 RX ADMIN — FAMOTIDINE 20 MG: 20 TABLET, FILM COATED ORAL at 10:02

## 2024-02-07 RX ADMIN — KETOROLAC TROMETHAMINE 15 MG: 30 INJECTION, SOLUTION INTRAMUSCULAR; INTRAVENOUS at 06:02

## 2024-02-07 RX ADMIN — CEFAZOLIN SODIUM 2 G: 2 SOLUTION INTRAVENOUS at 12:02

## 2024-02-07 RX ADMIN — POLYETHYLENE GLYCOL 3350 17 G: 17 POWDER, FOR SOLUTION ORAL at 10:02

## 2024-02-07 RX ADMIN — OXYCODONE HYDROCHLORIDE 5 MG: 5 TABLET ORAL at 11:02

## 2024-02-07 NOTE — PT/OT/SLP EVAL
Occupational Therapy   Evaluation and Discharge Note    Name: Jasmin Moreno  MRN: [id number]  Admitting Diagnosis: ORIF tibia P line fracture and fibula fracture; Ex fix removal right ankle  Recent Surgery: Procedure(s) (LRB):  ORIF, ANKLE (Right) 1 Day Post-Op    Recommendations:     Discharge therapy intensity: No Therapy Indicated   Discharge Equipment Recommendations: none  Barriers to discharge:  None    Assessment:     Jasmin Moreno is a 44 y.o. female with a medical diagnosis of ORIF tibia P line fracture and fibula fracture; Ex fix removal right ankle. At this time, patient reports being independent with all ADLs and functional mobility using RW while complying with NWB precautions for (R) LE. Patient was able to complete all UB/LB dressing and functional mobility to toilet with RW with MOD I. Patient will be discharging home with adequate family support. She did not report any equipment needs or concerns prior to discharge. Skilled OT services are not warranted at this time.    Plan:     OT to sign off as acute OT services are not warranted at this time.  Please re-consult if situation changes during this hospitalization.    Plan of Care Reviewed with: patient    Subjective     Chief Complaint: No complaints at this time  Patient/Family Comments/goals: To return home.     Occupational Profile:  Living Environment: Patient lives with her parents at this time.   Previous level of function: Prior to ORIF, patient was independent with ADLs using a RW, a reacher for dressing, and takes sponge baths on edge of tub. She is compliant with her NWB precautions.   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     Pain/Comfort:  Pain Rating 1: 0/10    Patients cultural, spiritual, Mandaen conflicts given the current situation: no    Objective:     OT  communicated with RN prior to session.      Patient was found  supine with (R) LE on wedge  with peripheral IV upon OT entry to room.    General Precautions: Standard, fall  Orthopedic Precautions: RLE non weight bearing  Braces:  (RLE boot)    Bed Mobility:    Patient completed Supine to Sit with independence  Patient completed Sit to Supine with independence    Functional Mobility/Transfers:  Patient completed Sit <> Stand Transfer with modified independence  with  rolling walker   Patient completed Toilet Transfer Step Transfer and complying with NWB precautions with MOD I with rolling walker.   Functional Mobility: Patient took forward, backward, and lateral steps using RW and complying with NWB precautions to and from toilet.     Activities of Daily Living:  Upper Body Dressing: modified independence to don gown seated on EOB  Lower Body Dressing: modified independence to don underwear seated at edge of bed. Able to bridge hips to pull up completely.     Functional Cognition:  Intact    Visual Perceptual Skills:  Intact    Upper Extremity Function:  Right Upper Extremity:   WFL    Left Upper Extremity:  WFL    Balance:   Intact    Therapeutic Positioning  Risk for acquired pressure injuries is increased due to relative decrease in mobility d/t hospitalization .    OT interventions performed during the course of today's session in an effort to prevent and/or reduce acquired pressure injuries:   Education was provided on benefits of and recommendations for therapeutic positioning    Skin assessment: all bony prominences were assessed    Findings: no redness or breakdown noted    OT recommendations for therapeutic positioning throughout hospitalization:   Follow [facility] Pressure Injury Prevention Protocol    Patient Education:  Patient provided with verbal education education regarding Discharge/DME recommendations.  Understanding was verbalized.     Patient left  supine with (R) LE on wedge  with  all needs met at  this time.     History:     Past Medical History:   Diagnosis Date    Fractures          Past Surgical History:   Procedure Laterality Date    APPLICATION, EXTERNAL FIXATION DEVICE Right 1/26/2024    Procedure: APPLICATION, EXTERNAL FIXATION DEVICE;  Surgeon: Tim Rashid MD;  Location: [facility];  Service: Orthopedics;  Laterality: Right;    none      OPEN REDUCTION AND INTERNAL FIXATION (ORIF) OF INJURY OF ANKLE Right 2/6/2024    Procedure: ORIF, ANKLE;  Surgeon: Ata Prado DO;  Location: [facility];  Service: Orthopedics;  Laterality: Right;       Time Tracking:     OT Date of Treatment:    OT Start Time: 1039  OT Stop Time: 1057  OT Total Time (min): 18 min    Billable Minutes:Evaluation low complexity    2/7/2024

## 2024-02-07 NOTE — PT/OT/SLP EVAL
Physical Therapy Evaluation and Discharge Note    Patient Name:  Jasmin Moreno   MRN:  [id number]    Recommendations:     Discharge therapy intensity: No Therapy Indicated   Discharge Equipment Recommendations: none   Barriers to discharge: None    Assessment:     Jasmin Moreno is a 44 y.o. female admitted with a medical diagnosis of closed displaced pilon fracture right tibia. .  At this time, patient is functioning at their prior level of function and does not require further acute PT services.     Recent Surgery: Procedure(s) (LRB):  ORIF, ANKLE (Right) 1 Day Post-Op    Plan:     During this hospitalization, patient does not require further acute PT services.  Please re-consult if situation changes.      Subjective     Chief Complaint: none  Patient/Family Comments/goals: none  Pain/Comfort:  Pain Rating 1: 0/10    Patients cultural, spiritual, Samaritan conflicts given the current situation: no    Living Environment:  Lives with mother and father in [facility] with 4 steps (bilateral rails) to enter.   Prior to admission, patients level of function was independent.  Equipment used at home: walker, standard, walker, rolling, dressing device.  DME owned (not currently used): none.  Upon discharge, patient will have assistance from family.    Objective:     Communicated with nurse prior to session.  Patient found supine with peripheral IV upon PT entry to room.    General Precautions: Standard, fall    Orthopedic Precautions:RLE non weight bearing   Braces:  (CAM boot RLE)  Respiratory Status: Room air    Exams:  Cognitive Exam:  Patient is oriented to Person, Place, Time, and Situation  Sensation: -       Intact  RLE ROM: WFL at hip and knee  RLE Strength: WFL at hip and knee  LLE ROM: WFL  LLE Strength: WFL    Functional Mobility:  Bed Mobility:  Supine to Sit: independence  Transfers:  Sit to Stand:  modified independence with rolling walker  Gait: 47 ft with RW & MOD I. No balance or safety awareness issues    Balance: good    AM-PAC 6 CLICK MOBILITY  Total Score:24     Education Provided:  Role and goals of PT, transfer training, bed mobility, gait training, balance training, safety awareness, assistive device, strengthening exercises, and importance of participating in PT to return to PLOF.    Patient left up in chair with all lines intact and call button in reach.    GOALS:   Multidisciplinary Problems       Physical Therapy Goals       Not on file                    History:     Past Medical History:   Diagnosis Date    Fractures        Past Surgical History:   Procedure Laterality Date    APPLICATION, EXTERNAL FIXATION DEVICE Right 1/26/2024    Procedure: APPLICATION, EXTERNAL FIXATION DEVICE;  Surgeon: Tim Rashid MD;  Location: [facility];  Service: Orthopedics;  Laterality: Right;    none      OPEN REDUCTION AND INTERNAL FIXATION (ORIF) OF INJURY OF ANKLE Right 2/6/2024    Procedure: ORIF, ANKLE;  Surgeon: Ata Prado DO;  Location: [facility];  Service: Orthopedics;  Laterality: Right;       Time Tracking:     PT Received On: 02/07/24  PT Start Time: 0944     PT Stop Time: 0954  PT Total Time (min): 10 min     Billable Minutes: Evaluation 10 minutes      02/07/2024

## 2024-02-07 NOTE — NURSING
Pt d/c to home. Post-op instructions, Rx, & f/u appt given to pt. Pt in stable condition. All questions answered.

## 2024-02-07 NOTE — PROGRESS NOTES
"[facility] - 8th Floor Med Surg  Orthopedics  Progress Note    Patient Name: Jasmin Moreno  MRN: [id number]  Admission Date: 2/5/2024  Hospital Length of Stay: 2 days  Attending Provider: Ata Prado DO  Primary Care Provider: Nancy, Primary Doctor  Follow-up For: Procedure(s) (LRB):  ORIF, ANKLE (Right)    Post-Operative Day: 1 Day Post-Op  Subjective:     Principal Problem:Closed displaced pilon fracture of right tibia    Principal Orthopedic Problem: 1 Day Post-Op   ORIF right pilon ankle fracture    Interval History: Doing well this morning. Elevated on Wedge. Pain is controlled. No numbness or tingling distally. Eager for DC home. Happy with her care overall.     Review of patient's allergies indicates:  No Known Allergies    Current Facility-Administered Medications   Medication    0.9%  NaCl infusion    cefazolin (ANCEF) 2 gram in dextrose 5% 50 mL IVPB (premix)    enoxaparin injection 40 mg    famotidine tablet 20 mg    gabapentin capsule 600 mg    ketorolac injection 15 mg    melatonin tablet 6 mg    methocarbamoL tablet 750 mg    morphine injection 4 mg    ondansetron injection 4 mg    oxyCODONE immediate release tablet 5 mg    oxyCODONE immediate release tablet Tab 10 mg    polyethylene glycol packet 17 g    sodium chloride 0.9% flush 10 mL     Facility-Administered Medications Ordered in Other Encounters   Medication    LIDOcaine (PF) 10 mg/ml (1%) injection 10 mg    metoclopramide injection 10 mg     Objective:     Vital Signs (Most Recent):  Temp: 99 °F (37.2 °C) (02/07/24 0742)  Pulse: 102 (02/07/24 0742)  Resp: 20 (02/07/24 0742)  BP: 113/68 (02/07/24 0742)  SpO2: 98 % (02/07/24 0742) Vital Signs (24h Range):  Temp:  [97.9 °F (36.6 °C)-99 °F (37.2 °C)] 99 °F (37.2 °C)  Pulse:  [] 102  Resp:  [12-20] 20  SpO2:  [96 %-100 %] 98 %  BP: (104-131)/(68-93) 113/68     Weight: 59.9 kg (132 lb)  Height: 5' 7.01" (170.2 cm)  Body mass index is 20.67 kg/m².      Intake/Output Summary " (Last 24 hours) at 2/7/2024 0849  Last data filed at 2/7/2024 0600  Gross per 24 hour   Intake 4383.8 ml   Output 600 ml   Net 3783.8 ml       Physical Exam:   General the patient is alert and oriented x3 no acute distress nontoxic-appearing appropriate affect.    Constitutional: Vital signs are examined and stable.  Resp: No signs of labored breathing                        RLE: -Skin: Dressing intact without loosening or failure, CAM boot in place, No signs of new abrasions or lacerations, no scars           -MSK: : Hip and Knee F/E, EHL/FHL, Gastroc/Tib anterior Strength 5/5           -Neuro:  Sensation intact to light touch L3-S1 dermatomes           -Lymphatic: No signs of lymphadenopathy           -CV: Capillary refill is less than 2 seconds. DP/PT pulses  2/4. Compartments soft and compressible.        Diagnostic Findings:   Significant Labs:   Recent Lab Results         02/07/24  0540   02/05/24  2339   02/05/24  2229   02/05/24  1913        Albumin/Globulin Ratio 1.3       1.1       ABO and RH     O POS         Albumin 3.1       3.5       ALP 59       68       ALT 32       42       Aniso 1+             Appearance, UA   Clear           PTT       27.1  Comment: For Minimal Heparin Infusion, the goal aPTT 64-85 seconds corresponds to an anti-Xa of 0.3-0.5.    For Low Intensity and High Intensity Heparin, the goal aPTT  seconds corresponds to an anti-Xa of 0.3-0.7       AST 26       29       Bacteria, UA   None Seen           Baso # 0.03       0.03       Basophil % 0.4       0.5       BILIRUBIN TOTAL 0.6       0.3       Bilirubin, UA   Negative           BUN 9.8       11.7       Calcium 8.4       8.8       Chloride 110       111       CO2 24       19       Color, UA   Colorless           Creatinine 0.75       0.75       eGFR >60       >60       Eos # 0.07       0.29       Eos % 0.8       4.7       Globulin, Total 2.4       3.1       Glucose 99       97       Glucose, UA   Normal           Group & Rh        O POS       Hematocrit 23.9       31.4       Hemoglobin 7.8       10.0       Immature Grans (Abs) 0.03       0.02       Immature Granulocytes 0.4       0.3       Indirect Rahat GEL       NEG  Comment: @02/05/24 22:43 by SEB:  Performed in tube       INR       1.0       Ketones, UA   Negative           Leukocyte Esterase, UA   25           Lymph # 1.30       1.56       LYMPH % 15.4       25.4       MCH 22.6       22.7       MCHC 32.6       31.8       MCV 69.3       71.2       Microcytosis 1+             Mono # 0.95       0.58       Mono % 11.2       9.4       MPV 9.0       9.6       Mucous, UA   Trace           Neut # 6.08       3.66       Neut % 71.8       59.7       NITRITE UA   Negative           nRBC 0.0       0.0       Blood, UA   Negative           pH, UA   5.5           Platelet Estimate Normal             Platelet Count 291       390       Poikilocytosis 1+             Potassium 3.8       4.2       Prealbumin       26.2       hCG Qualitative, Urine   Negative           PROTEIN TOTAL 5.5       6.6       Protein, UA   Negative           PT       12.8       RBC 3.45       4.41       RBC Morph Abnormal             RBC, UA   0-5           RDW 19.4       19.7       Sodium 140       140       Specific Gravity,UA   1.010           Specimen Outdate       02/08/2024 23:59       Squamous Epithelial Cells, UA   Trace           Target Cells 1+             Urobilinogen, UA   Normal           Vitamin D       25.7       WBC, UA   0-5           WBC 8.46       6.14                Significant Imaging: I have reviewed and interpreted all pertinent imaging results/findings.     Assessment/Plan:     Active Diagnoses:    Diagnosis Date Noted POA    PRINCIPAL PROBLEM:  Closed displaced pilon fracture of right tibia [S82.871A] 02/06/2024 Yes      Problems Resolved During this Admission:   H&H down slightly. Will repeat prior to DC this afternoon.  NWB RLE, ROMAT. May come out of boot for ROM exercises.Strict elevation. Daily  dressing changes beginning tomorrow as needed. Lovenox for DVT ppx. Aspirin 81 mg BID at discharge. Continue multimodal pain control.   We will follow up with her in approx 3 weeks for wound check for repeat x rays and evaluation.       The above findings, diagnostics, and treatment plan were discussed with Dr. Prado who is in agreement with the plan of care except as stated in additional documentation.      Desirae Trotter PA-C  Orthopedic Trauma Surgery  [facility]

## 2024-02-07 NOTE — NURSING
Nurses Note -- 4 Eyes      2/6/2024   10:46 PM      Skin assessed during: Admit      [x] No Altered Skin Integrity Present    []Prevention Measures Documented      [] Yes- Altered Skin Integrity Present or Discovered   [] LDA Added if Not in Epic (Describe Wound)   [] New Altered Skin Integrity was Present on Admit and Documented in LDA   [] Wound Image Taken    Wound Care Consulted? No    Attending Nurse:  Anna Ybarra RN/Staff Member:   FLAVIA Reynolds

## 2024-02-08 LAB
OHS QRS DURATION: 72 MS
OHS QTC CALCULATION: 439 MS

## 2024-02-08 NOTE — DISCHARGE SUMMARY
Discharge Summary    Admit Date: 2/5/2024     Discharge Date: 2/8/2024     Operative Procedure: ORIF, ANKLE (Right: Ankle)     History of Present Illness/Hospital Course: Patient admitted for fixation of right pilon ankle fracture and external fixator removal. She tolerated surgery well with adequate pain control and was mobilizing well with therapy. H&H drop some POD1 but remained stable through the day. She remained asymptomatic and was stable for DC home with stable vitals.    Discharge Disposition: Home    Activity: NWB to affected extremity ; ROMAT    Diet: Resume previous home diet    Medications:      Medication List        START taking these medications      oxyCODONE-acetaminophen  mg per tablet  Commonly known as: PERCOCET  Take 1 tablet by mouth every 4 (four) hours as needed for Pain.            CHANGE how you take these medications      methocarbamoL 750 MG Tab  Commonly known as: ROBAXIN  Take 1 tablet (750 mg total) by mouth 3 (three) times daily as needed (spasms).  What changed:   when to take this  reasons to take this            CONTINUE taking these medications      aspirin 81 MG EC tablet  Commonly known as: ECOTRIN  Take 1 tablet (81 mg total) by mouth 2 (two) times a day.     gabapentin 600 MG tablet  Commonly known as: NEURONTIN  Take 1 tablet (600 mg total) by mouth 3 (three) times daily.     ibuprofen 800 MG tablet  Commonly known as: ADVIL,MOTRIN  Take 1 tablet (800 mg total) by mouth 3 (three) times daily.            STOP taking these medications      HYDROcodone-acetaminophen  mg per tablet  Commonly known as: NORCO               Where to Get Your Medications        These medications were sent to [facility] - [address]  [address], [facility] [address]      Phone: [phone]   aspirin 81 MG EC tablet  methocarbamoL 750 MG Tab  oxyCODONE-acetaminophen  mg per tablet          Discharge  Instructions: If in splint, keep clean and dry until follow up. Otherwise daily dry dressing changes until follow up. Keep incisions clean and dry. Do not apply ointments or creams.    Follow Up: Dr. Prado in approx 3 weeks    The above findings, diagnostics, and treatment plan were discussed with Dr. Prado who is in agreement with the plan of care except as stated in additional documentation.     [facility]   Orthopedic Trauma

## 2024-02-29 ENCOUNTER — HOSPITAL ENCOUNTER (OUTPATIENT)
Dept: RADIOLOGY | Facility: CLINIC | Age: 44
Discharge: HOME | End: 2024-02-29
Attending: ORTHOPAEDIC SURGERY
Payer: MEDICAID

## 2024-02-29 ENCOUNTER — OFFICE VISIT (OUTPATIENT)
Dept: ORTHOPEDICS | Facility: CLINIC | Age: 44
End: 2024-02-29
Payer: MEDICAID

## 2024-02-29 VITALS
HEART RATE: 98 BPM | WEIGHT: 132.06 LBS | BODY MASS INDEX: 20.73 KG/M2 | HEIGHT: 67 IN | RESPIRATION RATE: 18 BRPM | SYSTOLIC BLOOD PRESSURE: 126 MMHG | DIASTOLIC BLOOD PRESSURE: 80 MMHG

## 2024-02-29 DIAGNOSIS — S82.871D CLOSED DISPLACED PILON FRACTURE OF RIGHT TIBIA WITH ROUTINE HEALING, SUBSEQUENT ENCOUNTER: Primary | ICD-10-CM

## 2024-02-29 DIAGNOSIS — S82.871D CLOSED DISPLACED PILON FRACTURE OF RIGHT TIBIA WITH ROUTINE HEALING, SUBSEQUENT ENCOUNTER: ICD-10-CM

## 2024-02-29 PROCEDURE — 3079F DIAST BP 80-89 MM HG: CPT | Mod: CPTII,,, | Performed by: PHYSICIAN ASSISTANT

## 2024-02-29 PROCEDURE — 3074F SYST BP LT 130 MM HG: CPT | Mod: CPTII,,, | Performed by: PHYSICIAN ASSISTANT

## 2024-02-29 PROCEDURE — 73610 X-RAY EXAM OF ANKLE: CPT | Mod: RT,,, | Performed by: ORTHOPAEDIC SURGERY

## 2024-02-29 PROCEDURE — 1159F MED LIST DOCD IN RCRD: CPT | Mod: CPTII,,, | Performed by: PHYSICIAN ASSISTANT

## 2024-02-29 PROCEDURE — 99024 POSTOP FOLLOW-UP VISIT: CPT | Mod: ,,, | Performed by: PHYSICIAN ASSISTANT

## 2024-02-29 RX ORDER — ERGOCALCIFEROL 1.25 MG/1
50000 CAPSULE ORAL
Qty: 8 CAPSULE | Refills: 0 | Status: SHIPPED | OUTPATIENT
Start: 2024-02-29 | End: 2024-04-19

## 2024-02-29 RX ORDER — METHOCARBAMOL 750 MG/1
750 TABLET, FILM COATED ORAL 3 TIMES DAILY PRN
Qty: 30 TABLET | Refills: 0 | Status: SHIPPED | OUTPATIENT
Start: 2024-02-29 | End: 2024-03-10

## 2024-02-29 RX ORDER — OXYCODONE AND ACETAMINOPHEN 10; 325 MG/1; MG/1
1 TABLET ORAL EVERY 4 HOURS PRN
Qty: 42 TABLET | Refills: 0 | Status: SHIPPED | OUTPATIENT
Start: 2024-02-29 | End: 2024-03-07

## 2024-02-29 NOTE — PROGRESS NOTES
Subjective:       Patient ID: Jasmin Moreno is a 44 y.o. female.  Chief Complaint   Patient presents with    Right Ankle - Post-op Evaluation     3 week f/u orif right pilon fx, nwb to rle, not wearing boot, reports was told by nurse at hospital to not wear if uncomfortable.          HPI    Patient presents for 3 week follow up ORIF right pilon fracture.NWB. Has not been wearing boot. States that she was told she did not have to wear the boot if it was bothering her. Has some stiffness at the ankle today as expected. Has not been doing much Range of motion. No numbness or tingling distally.     ROS:  Constitutional: Denies fever chills  Eyes: No change in vision  ENT: No ringing or current infections  CV: No chest pain  Resp: No labored breathing  MSK: Pain evident at site of injury located in HPI,   Integ: No signs of abrasions or lacerations  Neuro: No numbness or tingling  Lymphatic: No swelling outside the area of injury     Current Outpatient Medications on File Prior to Visit   Medication Sig Dispense Refill    aspirin (ECOTRIN) 81 MG EC tablet Take 1 tablet (81 mg total) by mouth 2 (two) times a day. 84 tablet 0    gabapentin (NEURONTIN) 600 MG tablet Take 1 tablet (600 mg total) by mouth 3 (three) times daily. 90 tablet 11    ibuprofen (ADVIL,MOTRIN) 800 MG tablet Take 1 tablet (800 mg total) by mouth 3 (three) times daily. 60 tablet 0    [DISCONTINUED] methocarbamoL (ROBAXIN) 750 MG Tab Take 1 tablet (750 mg total) by mouth 3 (three) times daily as needed (spasms). 30 tablet 0    [DISCONTINUED] oxyCODONE-acetaminophen (PERCOCET)  mg per tablet Take 1 tablet by mouth every 4 (four) hours as needed for Pain. 42 tablet 0     Current Facility-Administered Medications on File Prior to Visit   Medication Dose Route Frequency Provider Last Rate Last Admin    LIDOcaine (PF) 10 mg/ml (1%) injection 10 mg  1 mL Intradermal Once Amaya Fajardo MD        metoclopramide injection 10 mg  10 mg Intravenous  "Once PRN Amaya Fajardo MD              Objective:      /80   Pulse 98   Resp 18   Ht 5' 7" (1.702 m)   Wt 59.9 kg (132 lb 0.9 oz)   LMP 01/15/2024 (Approximate)   BMI 20.68 kg/m²   Physical Exam  General the patient is alert and oriented x3 no acute distress nontoxic-appearing appropriate affect.    Constitutional: Vital signs are examined and stable.  Resp: No signs of labored breathing    RLE: -Skin: incisions healing well without erythema,drainage, signs of dehiscence, No signs of new abrasions or lacerations, no scars           -MSK: : Hip and Knee F/E, EHL/FHL, Gastroc/Tib anterior Strength 5/5; moderate stiffness with dorsiflexion but is able to get to 90 degrees at ankle           -Neuro:  Sensation intact to light touch L3-S1 dermatomes           -Lymphatic: No signs of lymphadenopathy           -CV: Capillary refill is less than 2 seconds. DP/PT pulses  2/4. Compartments soft and compressible.          Body mass index is 20.68 kg/m².  Ideal body weight: 61.6 kg (135 lb 12.9 oz)  No results found for: "HGBA1C"  Hgb   Date Value Ref Range Status   02/07/2024 7.9 (L) 12.0 - 16.0 g/dL Final   02/07/2024 7.8 (L) 12.0 - 16.0 g/dL Final     Hct   Date Value Ref Range Status   02/07/2024 24.3 (L) 37.0 - 47.0 % Final   02/07/2024 23.9 (L) 37.0 - 47.0 % Final     No results found for: "IRON"  No components found for: "FROLATE"  Vit D 25 OH   Date Value Ref Range Status   02/05/2024 25.7 (L) 30.0 - 80.0 ng/mL Final     WBC   Date Value Ref Range Status   02/07/2024 8.46 4.50 - 11.50 x10(3)/mcL Final   02/05/2024 6.14 4.50 - 11.50 x10(3)/mcL Final       Radiology: 3 view x ray right ankle: hardware intact without loosening or failure. No consolidation as expected in the acute post operative period.         Assessment:         1. Closed displaced pilon fracture of right tibia with routine healing, subsequent encounter  X-Ray Ankle Complete Right    ergocalciferol (ERGOCALCIFEROL) 50,000 unit Cap    " oxyCODONE-acetaminophen (PERCOCET)  mg per tablet    methocarbamoL (ROBAXIN) 750 MG Tab    Ambulatory referral/consult to Physical/Occupational Therapy              Plan:         No follow-ups on file.    Jasmin was seen today for post-op evaluation.    Diagnoses and all orders for this visit:    Closed displaced pilon fracture of right tibia with routine healing, subsequent encounter  -     X-Ray Ankle Complete Right; Future  -     ergocalciferol (ERGOCALCIFEROL) 50,000 unit Cap; Take 1 capsule (50,000 Units total) by mouth every 7 days. for 8 doses  -     oxyCODONE-acetaminophen (PERCOCET)  mg per tablet; Take 1 tablet by mouth every 4 (four) hours as needed for Pain.  -     methocarbamoL (ROBAXIN) 750 MG Tab; Take 1 tablet (750 mg total) by mouth 3 (three) times daily as needed (spasms).  -     Ambulatory referral/consult to Physical/Occupational Therapy; Future        -Remain NWB LLE, ROMAT. Discussed importance of ROM at the ankle. Therapy order provided today, will fax to place of her choice. Refill medications today. Start vitamin D as it was low at hospital [facility]. 8 weeks 50,000 units once weekly.   - Boot while mobilizing and at night.   -Follow up in 6 weeks for repeat x rays and evaluation.   -ED precautions given    The above findings, diagnostics, and treatment plan were discussed with Dr. Prado who is in agreement with the plan of care except as stated in additional documentation.       Desirae Trotter PA-C          Future Appointments   Date Time Provider Department Center   4/11/2024  9:00 AM Ata Prado DO [facility] [place]

## 2024-04-11 ENCOUNTER — OFFICE VISIT (OUTPATIENT)
Dept: ORTHOPEDICS | Facility: CLINIC | Age: 44
End: 2024-04-11
Payer: MEDICAID

## 2024-04-11 ENCOUNTER — HOSPITAL ENCOUNTER (OUTPATIENT)
Dept: RADIOLOGY | Facility: CLINIC | Age: 44
Discharge: HOME | End: 2024-04-11
Attending: ORTHOPAEDIC SURGERY
Payer: MEDICAID

## 2024-04-11 VITALS
DIASTOLIC BLOOD PRESSURE: 73 MMHG | SYSTOLIC BLOOD PRESSURE: 136 MMHG | HEIGHT: 67 IN | BODY MASS INDEX: 20.73 KG/M2 | WEIGHT: 132.06 LBS | HEART RATE: 93 BPM

## 2024-04-11 DIAGNOSIS — S82.871D CLOSED DISPLACED PILON FRACTURE OF RIGHT TIBIA WITH ROUTINE HEALING, SUBSEQUENT ENCOUNTER: ICD-10-CM

## 2024-04-11 DIAGNOSIS — S82.871D CLOSED DISPLACED PILON FRACTURE OF RIGHT TIBIA WITH ROUTINE HEALING, SUBSEQUENT ENCOUNTER: Primary | ICD-10-CM

## 2024-04-11 PROCEDURE — 3078F DIAST BP <80 MM HG: CPT | Mod: CPTII,,, | Performed by: ORTHOPAEDIC SURGERY

## 2024-04-11 PROCEDURE — 3075F SYST BP GE 130 - 139MM HG: CPT | Mod: CPTII,,, | Performed by: ORTHOPAEDIC SURGERY

## 2024-04-11 PROCEDURE — 73610 X-RAY EXAM OF ANKLE: CPT | Mod: RT,,, | Performed by: ORTHOPAEDIC SURGERY

## 2024-04-11 PROCEDURE — 1159F MED LIST DOCD IN RCRD: CPT | Mod: CPTII,,, | Performed by: ORTHOPAEDIC SURGERY

## 2024-04-11 PROCEDURE — 99024 POSTOP FOLLOW-UP VISIT: CPT | Mod: ,,, | Performed by: ORTHOPAEDIC SURGERY

## 2024-04-11 RX ORDER — METHOCARBAMOL 500 MG/1
500 TABLET, FILM COATED ORAL 4 TIMES DAILY
COMMUNITY

## 2024-04-11 NOTE — PROGRESS NOTES
"Subjective:       Patient ID: Jasmin Moreno is a 44 y.o. female.  Chief Complaint   Patient presents with    Right Ankle - Follow-up     9.5 week f/u from ORIF right pilon fx. Present in boot.         Follow-up        Patient presents for 10 week follow up ORIF right pilon fracture.  patient is doing well been nonweightbearing in a boot.  She is doing daily exercises maintaining her range of motion.  She has some dull achy pain in her ankle as expected no numbness.    ROS:  Constitutional: Denies fever chills  Eyes: No change in vision  ENT: No ringing or current infections  CV: No chest pain  Resp: No labored breathing  MSK: Pain evident at site of injury located in HPI,   Integ: No signs of abrasions or lacerations  Neuro: No numbness or tingling  Lymphatic: No swelling outside the area of injury     Current Outpatient Medications on File Prior to Visit   Medication Sig Dispense Refill    aspirin (ECOTRIN) 81 MG EC tablet Take 1 tablet (81 mg total) by mouth 2 (two) times a day. 84 tablet 0    ergocalciferol (ERGOCALCIFEROL) 50,000 unit Cap Take 1 capsule (50,000 Units total) by mouth every 7 days. for 8 doses 8 capsule 0    gabapentin (NEURONTIN) 600 MG tablet Take 1 tablet (600 mg total) by mouth 3 (three) times daily. 90 tablet 11    ibuprofen (ADVIL,MOTRIN) 800 MG tablet Take 1 tablet (800 mg total) by mouth 3 (three) times daily. 60 tablet 0     Current Facility-Administered Medications on File Prior to Visit   Medication Dose Route Frequency Provider Last Rate Last Admin    LIDOcaine (PF) 10 mg/ml (1%) injection 10 mg  1 mL Intradermal Once Amaya Fajardo MD        metoclopramide injection 10 mg  10 mg Intravenous Once PRN Amaya Fajardo MD              Objective:      /73   Pulse 93   Ht 5' 7" (1.702 m)   Wt 59.9 kg (132 lb 0.9 oz)   BMI 20.68 kg/m²   Physical Exam  General the patient is alert and oriented x3 no acute distress nontoxic-appearing appropriate affect.    Constitutional: " "Vital signs are examined and stable.  Resp: No signs of labored breathing    RLE: -Skin: incisions healing well without erythema,drainage, signs of dehiscence, No signs of new abrasions or lacerations, no scars           -MSK: : Hip and Knee F/E, EHL/FHL, Gastroc/Tib anterior Strength 5/5; stiffness has improved           -Neuro:  Sensation intact to light touch L3-S1 dermatomes           -Lymphatic: No signs of lymphadenopathy           -CV: Capillary refill is less than 2 seconds. DP/PT pulses  2/4. Compartments soft and compressible.          Body mass index is 20.68 kg/m².  Ideal body weight: 61.6 kg (135 lb 12.9 oz)  No results found for: "HGBA1C"  Hgb   Date Value Ref Range Status   02/07/2024 7.9 (L) 12.0 - 16.0 g/dL Final   02/07/2024 7.8 (L) 12.0 - 16.0 g/dL Final     Hct   Date Value Ref Range Status   02/07/2024 24.3 (L) 37.0 - 47.0 % Final   02/07/2024 23.9 (L) 37.0 - 47.0 % Final     No results found for: "IRON"  No components found for: "FROLATE"  Vit D 25 OH   Date Value Ref Range Status   02/05/2024 25.7 (L) 30.0 - 80.0 ng/mL Final     WBC   Date Value Ref Range Status   02/07/2024 8.46 4.50 - 11.50 x10(3)/mcL Final   02/05/2024 6.14 4.50 - 11.50 x10(3)/mcL Final       Radiology: 3 view x ray right ankle: hardware intact without loosening or failure. No consolidation as expected in the acute post operative period.         Assessment:         1. Closed displaced pilon fracture of right tibia with routine healing, subsequent encounter  X-Ray Ankle Complete Right              Plan:         No follow-ups on file.    Jasmin was seen today for follow-up.    Diagnoses and all orders for this visit:    Closed displaced pilon fracture of right tibia with routine healing, subsequent encounter  -     X-Ray Ankle Complete Right; Future        -transition to a tall walking boot over the next week.  then transitioning into a tennis shoe.  We will do this slowly.  If she is having significant pain we will keep " him in a boot to next visit.  Follow up in 2 months.  She does have severe injury to her ankle she is at high risk of posttraumatic arthritis and complications with this.  Overall I think she has been doing great currently.  We will get her a handicap pass follow up in 8 weeks.    This note/OR report was created with the assistance of  voice recognition software or phone  dictation.  There may be transcription errors as a result of using this technology however minimal. Effort has been made to assure accuracy of transcription but any obvious errors or omissions should be clarified with the author of the document.       Ata Prado DO  Orthopedic Trauma Surgery       Future Appointments   Date Time Provider Department Center   6/12/2024  9:00 AM Ata Prado DO [facility]

## 2024-06-12 ENCOUNTER — HOSPITAL ENCOUNTER (OUTPATIENT)
Dept: RADIOLOGY | Facility: CLINIC | Age: 44
Discharge: HOME | End: 2024-06-12
Attending: ORTHOPAEDIC SURGERY
Payer: MEDICAID

## 2024-06-12 ENCOUNTER — OFFICE VISIT (OUTPATIENT)
Dept: ORTHOPEDICS | Facility: CLINIC | Age: 44
End: 2024-06-12
Payer: MEDICAID

## 2024-06-12 VITALS
DIASTOLIC BLOOD PRESSURE: 82 MMHG | WEIGHT: 132.06 LBS | HEART RATE: 80 BPM | SYSTOLIC BLOOD PRESSURE: 131 MMHG | BODY MASS INDEX: 20.73 KG/M2 | HEIGHT: 67 IN

## 2024-06-12 DIAGNOSIS — S82.871D CLOSED DISPLACED PILON FRACTURE OF RIGHT TIBIA WITH ROUTINE HEALING, SUBSEQUENT ENCOUNTER: Primary | ICD-10-CM

## 2024-06-12 DIAGNOSIS — S82.871D CLOSED DISPLACED PILON FRACTURE OF RIGHT TIBIA WITH ROUTINE HEALING, SUBSEQUENT ENCOUNTER: ICD-10-CM

## 2024-06-12 PROCEDURE — 73610 X-RAY EXAM OF ANKLE: CPT | Mod: RT,,, | Performed by: ORTHOPAEDIC SURGERY

## 2024-06-12 PROCEDURE — 99213 OFFICE O/P EST LOW 20 MIN: CPT | Mod: ,,, | Performed by: PHYSICIAN ASSISTANT

## 2024-06-12 PROCEDURE — 3075F SYST BP GE 130 - 139MM HG: CPT | Mod: CPTII,,, | Performed by: PHYSICIAN ASSISTANT

## 2024-06-12 PROCEDURE — 3008F BODY MASS INDEX DOCD: CPT | Mod: CPTII,,, | Performed by: PHYSICIAN ASSISTANT

## 2024-06-12 PROCEDURE — 3079F DIAST BP 80-89 MM HG: CPT | Mod: CPTII,,, | Performed by: PHYSICIAN ASSISTANT

## 2024-06-12 PROCEDURE — 1159F MED LIST DOCD IN RCRD: CPT | Mod: CPTII,,, | Performed by: PHYSICIAN ASSISTANT

## 2024-06-12 NOTE — PROGRESS NOTES
"Subjective:       Patient ID: Jasmin Moreno is a 44 y.o. female.  Chief Complaint   Patient presents with    Follow-up     4mo sp ORIF RIGHT PILON FX, SX 2-6-24        Follow-up        Patient presents for 4 month follow up ORIF right pilon fracture. Progressed weight bearing. Having some improvement.  She is doing daily exercises maintaining her range of motion. Mild stiffness with dorsiflexion. Still ambulating in the boot. 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     ROS:  Constitutional: Denies fever chills  Eyes: No change in vision  ENT: No ringing or current infections  CV: No chest pain  Resp: No labored breathing  MSK: Pain evident at site of injury located in HPI,   Integ: No signs of abrasions or lacerations  Neuro: No numbness or tingling  Lymphatic: No swelling outside the area of injury     Current Outpatient Medications on File Prior to Visit   Medication Sig Dispense Refill    gabapentin (NEURONTIN) 600 MG tablet Take 1 tablet (600 mg total) by mouth 3 (three) times daily. 90 tablet 11    methocarbamoL (ROBAXIN) 500 MG Tab Take 500 mg by mouth 4 (four) times daily.      aspirin (ECOTRIN) 81 MG EC tablet Take 1 tablet (81 mg total) by mouth 2 (two) times a day. 84 tablet 0    ibuprofen (ADVIL,MOTRIN) 800 MG tablet Take 1 tablet (800 mg total) by mouth 3 (three) times daily. (Patient not taking: Reported on 4/11/2024) 60 tablet 0     Current Facility-Administered Medications on File Prior to Visit   Medication Dose Route Frequency Provider Last Rate Last Admin    LIDOcaine (PF) 10 mg/ml (1%) injection 10 mg  1 mL Intradermal Once Amaya Fajardo MD        metoclopramide injection 10 mg  10 mg Intravenous Once PRN Amaya Fajardo MD              Objective:      /82   Pulse 80   Ht 5' 7" (1.702 m)   Wt 59.9 kg (132 lb 0.9 oz)   BMI 20.68 kg/m²   Physical Exam  General the " "patient is alert and oriented x3 no acute distress nontoxic-appearing appropriate affect.    Constitutional: Vital signs are examined and stable.  Resp: No signs of labored breathing    RLE: -Skin: incisions well healed and maturing.  No signs of new abrasions or lacerations, no scars           -MSK: : Hip and Knee F/E, EHL/FHL, Gastroc/Tib anterior Strength 5/5; stiffness has improved but still does have some limitation with dorsiflexion.           -Neuro:  Sensation intact to light touch L3-S1 dermatomes           -Lymphatic: No signs of lymphadenopathy           -CV: Capillary refill is less than 2 seconds. DP/PT pulses  2/4. Compartments soft and compressible.          Body mass index is 20.68 kg/m².  Ideal body weight: 61.6 kg (135 lb 12.9 oz)  No results found for: "HGBA1C"  Hgb   Date Value Ref Range Status   02/07/2024 7.9 (L) 12.0 - 16.0 g/dL Final   02/07/2024 7.8 (L) 12.0 - 16.0 g/dL Final     Hct   Date Value Ref Range Status   02/07/2024 24.3 (L) 37.0 - 47.0 % Final   02/07/2024 23.9 (L) 37.0 - 47.0 % Final     No results found for: "IRON"  No components found for: "FROLATE"  Vitamin D   Date Value Ref Range Status   02/05/2024 25.7 (L) 30.0 - 80.0 ng/mL Final     WBC   Date Value Ref Range Status   02/07/2024 8.46 4.50 - 11.50 x10(3)/mcL Final   02/05/2024 6.14 4.50 - 11.50 x10(3)/mcL Final       Radiology: 3 view x ray right ankle: hardware intact without loosening or failure. Continued consolidation as compared to previous images. Stable ankle mortise.         Assessment:         1. Closed displaced pilon fracture of right tibia with routine healing, subsequent encounter  X-Ray Ankle Complete Right    Ambulatory referral/consult to Physical/Occupational Therapy              Plan:         Follow up in about 2 months (around 8/12/2024), or if symptoms worsen or fail to improve.    Jasmin was seen today for follow-up.    Diagnoses and all orders for this visit:    Closed displaced pilon fracture of " right tibia with routine healing, subsequent encounter  -     X-Ray Ankle Complete Right; Future  -     Ambulatory referral/consult to Physical/Occupational Therapy; Future      Overall doing very well. Ambulating in the boot with no other assistive devices. She has a severe injury at risk of post traumatic arthritis. She is understanding of this. Will try to transition to regular shoe vs regular shoe with lace up ankle brace. Off work for 8 more weeks until she is ambulatory in regular shoes. I will write her a work excuse today for off work. Will follow up with her in approx 2 more months for repeat x rays and evaluation.     The above findings, diagnostics, and treatment plan were discussed with Dr. Prado who is in agreement with the plan of care except as stated in additional documentation.       Alexandra Blair Lemaire, PA-C [facility]   Orthopedic Trauma      Future Appointments   Date Time Provider Department Center   8/13/2024  9:00 AM Ata Prado DO [facility] [place]

## 2024-06-12 NOTE — LETTER
[facility]  [address] [place] [address]. [address]  [place], [address]  Phone: [phone]  Fax: [phone]    Name:Jasmin Moreno  :1980   Date:2024     PATIENT IS UNABLE TO WORK AS OF: 2024  [_] Pending treatment.  [_] For approximately [_] Days [8] Weeks [_] Months  [_] Pending diagnostic testing.  [_] Pending surgical treatment.  [_] For approximately _ months (Post Surgery)    PATIENT IS ABLE TO RETURN TO WORK AS OF:    [_] SEDENTARY WORK: Lifting 10 pounds maximum and occasionally lifting and/or carrying articles such as dockers, ledgers and small tools.  Although a sedentary job is defined as one which involved sitting, a certain amount of walking and standing are required only occasionally and other sedentary criteria are met.    [_] LIGHT WORK: Lifting 20 pounds with frequent lifting and/or carrying objects weighing up to 10 pounds.  Even though the weight lifted may be only a negotiable amount, a job is in the category when it involves sitting most of the time with a degree of pushing/pulling of arm and/or leg controls.    [_] MEDIUM WORK: Lifting of 50 pounds maximum with frequent lifting and/or carrying of objects up to 25 pounds.    [_] HEAVY WORK: Lifting of 100 pounds maximum with frequent lifting and/or carrying objects up to 50 pounds.    [_] VERY HEAVY WORK: Lifting objects in excess of 100 pounds with frequent lifting and/or carrying of objects weighing 50 pounds or more.    [_] REGULAR DUTY: [_] No Restrictions. [_] With Restrictions (See comments below):    COMMENTS: to be re addressed at follow up appointment     Ata Prado DO

## 2024-08-13 ENCOUNTER — OFFICE VISIT (OUTPATIENT)
Dept: ORTHOPEDICS | Facility: CLINIC | Age: 44
End: 2024-08-13
Payer: MEDICAID

## 2024-08-13 ENCOUNTER — HOSPITAL ENCOUNTER (OUTPATIENT)
Dept: RADIOLOGY | Facility: CLINIC | Age: 44
Discharge: HOME | End: 2024-08-13
Attending: ORTHOPAEDIC SURGERY
Payer: MEDICAID

## 2024-08-13 VITALS
WEIGHT: 130.06 LBS | DIASTOLIC BLOOD PRESSURE: 67 MMHG | HEIGHT: 67 IN | HEART RATE: 80 BPM | SYSTOLIC BLOOD PRESSURE: 139 MMHG | BODY MASS INDEX: 20.41 KG/M2

## 2024-08-13 DIAGNOSIS — S82.871D CLOSED DISPLACED PILON FRACTURE OF RIGHT TIBIA WITH ROUTINE HEALING, SUBSEQUENT ENCOUNTER: Primary | ICD-10-CM

## 2024-08-13 DIAGNOSIS — S82.871D CLOSED DISPLACED PILON FRACTURE OF RIGHT TIBIA WITH ROUTINE HEALING, SUBSEQUENT ENCOUNTER: ICD-10-CM

## 2024-08-13 PROCEDURE — 73610 X-RAY EXAM OF ANKLE: CPT | Mod: RT,,, | Performed by: ORTHOPAEDIC SURGERY

## 2024-08-13 PROCEDURE — 99213 OFFICE O/P EST LOW 20 MIN: CPT | Mod: ,,, | Performed by: ORTHOPAEDIC SURGERY

## 2024-08-13 PROCEDURE — 1159F MED LIST DOCD IN RCRD: CPT | Mod: CPTII,,, | Performed by: ORTHOPAEDIC SURGERY

## 2024-08-13 PROCEDURE — 3008F BODY MASS INDEX DOCD: CPT | Mod: CPTII,,, | Performed by: ORTHOPAEDIC SURGERY

## 2024-08-13 PROCEDURE — 3075F SYST BP GE 130 - 139MM HG: CPT | Mod: CPTII,,, | Performed by: ORTHOPAEDIC SURGERY

## 2024-08-13 PROCEDURE — 3078F DIAST BP <80 MM HG: CPT | Mod: CPTII,,, | Performed by: ORTHOPAEDIC SURGERY

## 2024-08-13 NOTE — PROGRESS NOTES
"Subjective:       Patient ID: Jasmin Moreno is a 44 y.o. female.  Chief Complaint   Patient presents with    Right Ankle - Follow-up     6 month f/u from ORIF right pilon fx. Ambulates without assistive devices. Denies increase in pain and discomfort.         Follow-up        Patient presents for 6 month follow up ORIF right pilon fracture.  She is full weight-bearing in a tennis shoe.  She has been doing very well.  Currently with minimal assist.  States she would like to return back to work.  No fevers no chills.  Here with a family today.  No numbness no tingling.    ROS:  Constitutional: Denies fever chills  Eyes: No change in vision  ENT: No ringing or current infections  CV: No chest pain  Resp: No labored breathing  MSK: Pain evident at site of injury located in HPI,   Integ: No signs of abrasions or lacerations  Neuro: No numbness or tingling  Lymphatic: No swelling outside the area of injury     Current Outpatient Medications on File Prior to Visit   Medication Sig Dispense Refill    aspirin (ECOTRIN) 81 MG EC tablet Take 1 tablet (81 mg total) by mouth 2 (two) times a day. 84 tablet 0    gabapentin (NEURONTIN) 600 MG tablet Take 1 tablet (600 mg total) by mouth 3 (three) times daily. (Patient not taking: Reported on 8/13/2024) 90 tablet 11    ibuprofen (ADVIL,MOTRIN) 800 MG tablet Take 1 tablet (800 mg total) by mouth 3 (three) times daily. (Patient not taking: Reported on 4/11/2024) 60 tablet 0    methocarbamoL (ROBAXIN) 500 MG Tab Take 500 mg by mouth 4 (four) times daily. (Patient not taking: Reported on 8/13/2024)       Current Facility-Administered Medications on File Prior to Visit   Medication Dose Route Frequency Provider Last Rate Last Admin    LIDOcaine (PF) 10 mg/ml (1%) injection 10 mg  1 mL Intradermal Once Amaya Fajardo MD        metoclopramide injection 10 mg  10 mg Intravenous Once PRN Amaya Fajardo MD              Objective:      /67   Pulse 80   Ht 5' 7" (1.702 m)   Wt " "59 kg (130 lb 1.1 oz)   BMI 20.37 kg/m²   Physical Exam  General the patient is alert and oriented x3 no acute distress nontoxic-appearing appropriate affect.    Constitutional: Vital signs are examined and stable.  Resp: No signs of labored breathing    RLE: -Skin:  Incisions well healed  No signs of new abrasions or lacerations, no scars           -MSK: : Hip and Knee F/E, EHL/FHL, Gastroc/Tib anterior Strength 5/5; minimal stiffness            -Neuro:  Sensation intact to light touch L3-S1 dermatomes           -Lymphatic: No signs of lymphadenopathy           -CV: Capillary refill is less than 2 seconds. DP/PT pulses  2/4. Compartments soft and compressible.          Body mass index is 20.37 kg/m².  Ideal body weight: 61.6 kg (135 lb 12.9 oz)  No results found for: "HGBA1C"  Hgb   Date Value Ref Range Status   02/07/2024 7.9 (L) 12.0 - 16.0 g/dL Final   02/07/2024 7.8 (L) 12.0 - 16.0 g/dL Final     Hct   Date Value Ref Range Status   02/07/2024 24.3 (L) 37.0 - 47.0 % Final   02/07/2024 23.9 (L) 37.0 - 47.0 % Final     No results found for: "IRON"  No components found for: "FROLATE"  Vitamin D   Date Value Ref Range Status   02/05/2024 25.7 (L) 30.0 - 80.0 ng/mL Final     WBC   Date Value Ref Range Status   02/07/2024 8.46 4.50 - 11.50 x10(3)/mcL Final   02/05/2024 6.14 4.50 - 11.50 x10(3)/mcL Final       Radiology: 3 view x ray right ankle: hardware intact without loosening or failure.  Healed fracture         Assessment:         1. Closed displaced pilon fracture of right tibia with routine healing, subsequent encounter  X-Ray Ankle Complete Right              Plan:         No follow-ups on file.    Jasmin was seen today for follow-up.    Diagnoses and all orders for this visit:    Closed displaced pilon fracture of right tibia with routine healing, subsequent encounter  -     X-Ray Ankle Complete Right; Future    Patient doing very well today.  Has not been back to work.  We will cleared to start work in 1 " week.  She has finished physical therapy.  She has great range of motion currently wearing a compression stocking no infections no signs of infection some mild swelling.  No skin breakdown.  Patient was walking with a cane for balance but overall does not in the cane on a day-to-day basis.  She would like to return back to work she states her leg feels good.  We discussed removal of hardware is not necessary but if problem arises she will call the office  And reschedule.  Follow up PRN.  .    This note/OR report was created with the assistance of  voice recognition software or phone  dictation.  There may be transcription errors as a result of using this technology however minimal. Effort has been made to assure accuracy of transcription but any obvious errors or omissions should be clarified with the author of the document.       Ata Prado, DO  Orthopedic Trauma Surgery     No future appointments.

## 2024-08-13 NOTE — LETTER
[facility]  [address] [place] [address]. [address], [address]  Phone: [phone]  Fax: [phone]    Name:Jasmin Moreno  :1980   Date:2024     PATIENT IS ABLE TO RETURN TO WORK AS OF: 24    [_] SEDENTARY WORK: Lifting 10 pounds maximum and occasionally lifting and/or carrying articles such as dockers, ledgers and small tools.  Although a sedentary job is defined as one which involved sitting, a certain amount of walking and standing are required only occasionally and other sedentary criteria are met.    [_] LIGHT WORK: Lifting 20 pounds with frequent lifting and/or carrying objects weighing up to 10 pounds.  Even though the weight lifted may be only a negotiable amount, a job is in the category when it involves sitting most of the time with a degree of pushing/pulling of arm and/or leg controls.    [_] MEDIUM WORK: Lifting of 50 pounds maximum with frequent lifting and/or carrying of objects up to 25 pounds.    [_] HEAVY WORK: Lifting of 100 pounds maximum with frequent lifting and/or carrying objects up to 50 pounds.    [_] VERY HEAVY WORK: Lifting objects in excess of 100 pounds with frequent lifting and/or carrying of objects weighing 50 pounds or more.    [X] REGULAR DUTY: [X] No Restrictions. [_] With Restrictions (See comments below):    COMMENTS     Ata Prado,

## (undated) DEVICE — GLOVE PROTEXIS NEU-THERA SZ6

## (undated) DEVICE — GLOVE SIGNATURE MICRO LTX 8

## (undated) DEVICE — DRAPE EXTREMITY STD 89X128IN

## (undated) DEVICE — BNDG COFLEX FOAM LF2 ST 6X5YD

## (undated) DEVICE — APPLICATOR CHLORAPREP ORN 26ML

## (undated) DEVICE — BIT DRILL QC 1.8X110MM

## (undated) DEVICE — BANDAGE COMPR 6IN HOOK 6INX5YD

## (undated) DEVICE — SUT 0 54IN COATED VICRYL U

## (undated) DEVICE — BANDAGE COMPR VELCLOSE 4INX5YD

## (undated) DEVICE — DRESSING XEROFORM 5X9IN

## (undated) DEVICE — KIT BASIC ORTHO UNIVERSITY

## (undated) DEVICE — SPONGE COTTON TRAY 4X4IN

## (undated) DEVICE — DRAPE ORTH SPLIT 77X108IN

## (undated) DEVICE — GLOVE SIGNATURE MICRO LTX 6.5

## (undated) DEVICE — GLOVE SENSICARE PI GRN 8

## (undated) DEVICE — KIT SURGICAL TURNOVER

## (undated) DEVICE — SUT CTD VICRYL CT-1 27

## (undated) DEVICE — PADDDING CAST WEBRIL 4YDX3IN

## (undated) DEVICE — GUIDEWIRE TROCAR TIP 1.4X150MM
Type: IMPLANTABLE DEVICE | Site: ANKLE | Status: NON-FUNCTIONAL
Removed: 2024-02-06

## (undated) DEVICE — SUT ETHILON 2-0 FSLX 30 BLK

## (undated) DEVICE — SOL 9P NACL IRR PIC IL

## (undated) DEVICE — STAPLER SKIN PROXIMATE WIDE

## (undated) DEVICE — DRAPE C-ARMOR EQUIPMENT COVER

## (undated) DEVICE — DRAPE STERI U-SHAPED 47X51IN

## (undated) DEVICE — GLOVE PROTEXIS HYDROGEL SZ9

## (undated) DEVICE — ELECTRODE REM POLYHESIVE II

## (undated) DEVICE — BANDAGE ESMARK 6INX3YD

## (undated) DEVICE — SUT ETHILON 3-0 FS-1 30

## (undated) DEVICE — DRESSING GAUZE XEROFORM 5X9

## (undated) DEVICE — TAPE SILK 3IN

## (undated) DEVICE — GLOVE SENSICARE PI GRN 7

## (undated) DEVICE — PAD CAST SPECIALIST STRL 6

## (undated) DEVICE — GLOVE PROTEXIS HYDROGEL SZ6

## (undated) DEVICE — BNDG NS SWIFT WRP ELAS 6INX5YD

## (undated) DEVICE — TOURNIQUET SB QC DP 34X4IN

## (undated) DEVICE — BOOT WALKER ROCKER MEDIUM

## (undated) DEVICE — SUT MCRYL PLUS 2-0 CT-1 36IN

## (undated) DEVICE — TOURNIQUET SB QC DP 24X4IN

## (undated) DEVICE — BIT DRILL CANN COMPR 2.7X145MM

## (undated) DEVICE — GLOVE PROTEXIS BLUE LATEX 9

## (undated) DEVICE — COVER TABLE HVY DTY 60X90IN

## (undated) DEVICE — 3.2 DRILL BIT

## (undated) DEVICE — GLOVE SENSICARE PI GRN 6.5

## (undated) DEVICE — GOWN SMARTGOWN 3XL XLONG

## (undated) DEVICE — MANIFOLD 4 PORT

## (undated) DEVICE — BIT DRILL 2.0MM D DEPTH 110MM

## (undated) DEVICE — BUCKET PLASTER DISPOSABLE

## (undated) DEVICE — SPONGE LAP 18X18 PREWASHED

## (undated) DEVICE — Device

## (undated) DEVICE — GLOVE SIGNATURE MICRO LTX 7

## (undated) DEVICE — BANDAGE ROLL COTTN 4.5INX4.1YD

## (undated) DEVICE — BANDAGE SWIFTWRAP ELAS 4INX5YD

## (undated) DEVICE — DRAPE C-ARM COVER EZ 36X28IN

## (undated) DEVICE — BIT BRILL 2.5

## (undated) DEVICE — COVER FULLGUARD SHOE HIGH-TOP